# Patient Record
Sex: FEMALE | Race: WHITE | NOT HISPANIC OR LATINO | Employment: OTHER | URBAN - METROPOLITAN AREA
[De-identification: names, ages, dates, MRNs, and addresses within clinical notes are randomized per-mention and may not be internally consistent; named-entity substitution may affect disease eponyms.]

---

## 2023-08-11 ENCOUNTER — HOSPITAL ENCOUNTER (OUTPATIENT)
Dept: DATA CONVERSION | Facility: HOSPITAL | Age: 68
Discharge: HOME | End: 2023-08-11

## 2023-08-11 DIAGNOSIS — T78.40XA ALLERGY, UNSPECIFIED, INITIAL ENCOUNTER: ICD-10-CM

## 2023-08-11 DIAGNOSIS — Z88.8 ALLERGY STATUS TO OTHER DRUGS, MEDICAMENTS AND BIOLOGICAL SUBSTANCES: ICD-10-CM

## 2023-08-11 LAB
ALBUMIN SERPL-MCNC: 3.8 GM/DL (ref 3.5–5)
ALBUMIN/GLOB SERPL: 1.2 RATIO (ref 1.5–3)
ALP BLD-CCNC: 89 U/L (ref 35–125)
ALT SERPL-CCNC: 10 U/L (ref 5–40)
ANION GAP SERPL CALCULATED.3IONS-SCNC: 14 MMOL/L (ref 0–19)
AST SERPL-CCNC: 15 U/L (ref 5–40)
BASOPHILS # BLD AUTO: 0.01 K/UL (ref 0–0.22)
BASOPHILS NFR BLD AUTO: 0.1 % (ref 0–1)
BILIRUB SERPL-MCNC: 0.4 MG/DL (ref 0.1–1.2)
BUN SERPL-MCNC: 33 MG/DL (ref 8–25)
BUN/CREAT SERPL: 23.6 RATIO (ref 8–21)
CALCIUM SERPL-MCNC: 9.1 MG/DL (ref 8.5–10.4)
CHLORIDE SERPL-SCNC: 105 MMOL/L (ref 97–107)
CO2 SERPL-SCNC: 23 MMOL/L (ref 24–31)
CREAT SERPL-MCNC: 1.4 MG/DL (ref 0.4–1.6)
DEPRECATED RDW RBC AUTO: 43.4 FL (ref 37–54)
DIFFERENTIAL METHOD BLD: ABNORMAL
EOSINOPHIL # BLD AUTO: 0 K/UL (ref 0–0.45)
EOSINOPHIL NFR BLD: 0 % (ref 0–3)
ERYTHROCYTE [DISTWIDTH] IN BLOOD BY AUTOMATED COUNT: 12.8 % (ref 11.7–15)
GFR SERPL CREATININE-BSD FRML MDRD: 41 ML/MIN/1.73 M2
GLOBULIN SER-MCNC: 3.1 G/DL (ref 1.9–3.7)
GLUCOSE SERPL-MCNC: 117 MG/DL (ref 65–99)
HCT VFR BLD AUTO: 42.7 % (ref 36–44)
HGB BLD-MCNC: 13.7 GM/DL (ref 12–15)
IMM GRANULOCYTES # BLD AUTO: 0.03 K/UL (ref 0–0.1)
LYMPHOCYTES # BLD AUTO: 0.82 K/UL (ref 1.2–3.2)
LYMPHOCYTES NFR BLD MANUAL: 9.3 % (ref 20–40)
MCH RBC QN AUTO: 29.5 PG (ref 26–34)
MCHC RBC AUTO-ENTMCNC: 32.1 % (ref 31–37)
MCV RBC AUTO: 92 FL (ref 80–100)
MONOCYTES # BLD AUTO: 0.56 K/UL (ref 0–0.8)
MONOCYTES NFR BLD MANUAL: 6.3 % (ref 0–8)
NEUTROPHILS # BLD AUTO: 7.43 K/UL
NEUTROPHILS # BLD AUTO: 7.43 K/UL (ref 1.8–7.7)
NEUTROPHILS.IMMATURE NFR BLD: 0.3 % (ref 0–1)
NEUTS SEG NFR BLD: 84 % (ref 50–70)
NRBC BLD-RTO: 0 /100 WBC
PLATELET # BLD AUTO: 288 K/UL (ref 150–450)
PMV BLD AUTO: 10.7 CU (ref 7–12.6)
POTASSIUM SERPL-SCNC: 4.4 MMOL/L (ref 3.4–5.1)
PROT SERPL-MCNC: 6.9 G/DL (ref 5.9–7.9)
RBC # BLD AUTO: 4.64 M/UL (ref 4–4.9)
SODIUM SERPL-SCNC: 142 MMOL/L (ref 133–145)
WBC # BLD AUTO: 8.9 K/UL (ref 4.5–11)

## 2023-08-12 LAB
IGA SERPL-MCNC: NORMAL MG/DL
IGE SERPL-ACNC: NORMAL [IU]/L
IGG SERPL-MCNC: NORMAL MG/DL
IGM SERPL-MCNC: NORMAL MG/DL

## 2023-08-17 ENCOUNTER — HOSPITAL ENCOUNTER (OUTPATIENT)
Dept: DATA CONVERSION | Facility: HOSPITAL | Age: 68
Discharge: HOME | End: 2023-08-17
Payer: MEDICARE

## 2023-08-17 DIAGNOSIS — N18.30 CHRONIC KIDNEY DISEASE, STAGE 3 UNSPECIFIED (MULTI): ICD-10-CM

## 2023-08-17 LAB
25(OH)D3 SERPL-MCNC: 25 NG/ML (ref 31–100)
ALBUMIN SERPL-MCNC: 3.8 GM/DL (ref 3.5–5)
ANION GAP SERPL CALCULATED.3IONS-SCNC: 16 MMOL/L (ref 0–19)
BUN SERPL-MCNC: 19 MG/DL (ref 8–25)
BUN/CREAT SERPL: 15.8 RATIO (ref 8–21)
CALCIUM SERPL-MCNC: 9 MG/DL (ref 8.5–10.4)
CHLORIDE SERPL-SCNC: 108 MMOL/L (ref 97–107)
CO2 SERPL-SCNC: 22 MMOL/L (ref 24–31)
CREAT SERPL-MCNC: 1.2 MG/DL (ref 0.4–1.6)
DEPRECATED RDW RBC AUTO: 42 FL (ref 37–54)
ERYTHROCYTE [DISTWIDTH] IN BLOOD BY AUTOMATED COUNT: 12.7 % (ref 11.7–15)
GFR SERPL CREATININE-BSD FRML MDRD: 49 ML/MIN/1.73 M2
GLUCOSE SERPL-MCNC: 118 MG/DL (ref 65–99)
HCT VFR BLD AUTO: 42.3 % (ref 36–44)
HGB BLD-MCNC: 13.7 GM/DL (ref 12–15)
MCH RBC QN AUTO: 29 PG (ref 26–34)
MCHC RBC AUTO-ENTMCNC: 32.4 % (ref 31–37)
MCV RBC AUTO: 89.4 FL (ref 80–100)
NRBC BLD-RTO: 0 /100 WBC
PHOSPHATE SERPL-MCNC: 3.5 MG/DL (ref 2.5–4.5)
PLATELET # BLD AUTO: 333 K/UL (ref 150–450)
PMV BLD AUTO: 9.9 CU (ref 7–12.6)
POTASSIUM SERPL-SCNC: 4.1 MMOL/L (ref 3.4–5.1)
PTH-INTACT SERPL-MCNC: 65 PG/ML (ref 15–65)
RBC # BLD AUTO: 4.73 M/UL (ref 4–4.9)
SODIUM SERPL-SCNC: 146 MMOL/L (ref 133–145)
WBC # BLD AUTO: 7.6 K/UL (ref 4.5–11)

## 2023-08-31 ENCOUNTER — HOSPITAL ENCOUNTER (OUTPATIENT)
Dept: DATA CONVERSION | Facility: HOSPITAL | Age: 68
Discharge: HOME | End: 2023-08-31
Payer: MEDICARE

## 2023-08-31 DIAGNOSIS — I10 ESSENTIAL (PRIMARY) HYPERTENSION: ICD-10-CM

## 2023-08-31 DIAGNOSIS — R06.02 SHORTNESS OF BREATH: ICD-10-CM

## 2023-09-21 ENCOUNTER — HOSPITAL ENCOUNTER (OUTPATIENT)
Dept: DATA CONVERSION | Facility: HOSPITAL | Age: 68
Discharge: HOME | End: 2023-09-21
Payer: MEDICARE

## 2023-09-21 DIAGNOSIS — Z00.00 ENCOUNTER FOR GENERAL ADULT MEDICAL EXAMINATION WITHOUT ABNORMAL FINDINGS: ICD-10-CM

## 2023-09-21 DIAGNOSIS — R05.1 ACUTE COUGH: ICD-10-CM

## 2023-10-02 DIAGNOSIS — K21.9 GASTROESOPHAGEAL REFLUX DISEASE, UNSPECIFIED WHETHER ESOPHAGITIS PRESENT: ICD-10-CM

## 2023-10-02 PROBLEM — J96.00 ACUTE RESPIRATORY FAILURE (MULTI): Status: ACTIVE | Noted: 2023-10-02

## 2023-10-02 PROBLEM — R10.9 ABDOMINAL PAIN: Status: ACTIVE | Noted: 2023-10-02

## 2023-10-02 PROBLEM — J06.9 ACUTE UPPER RESPIRATORY INFECTION: Status: ACTIVE | Noted: 2023-10-02

## 2023-10-02 PROBLEM — J30.9 ALLERGIC RHINITIS: Status: ACTIVE | Noted: 2023-10-02

## 2023-10-02 PROBLEM — J01.90 ACUTE SINUSITIS: Status: ACTIVE | Noted: 2023-10-02

## 2023-10-02 RX ORDER — OMEPRAZOLE 40 MG/1
40 CAPSULE, DELAYED RELEASE ORAL
COMMUNITY
Start: 2023-08-31

## 2023-10-02 RX ORDER — OMEPRAZOLE 40 MG/1
CAPSULE, DELAYED RELEASE ORAL
Qty: 30 CAPSULE | Refills: 0 | Status: SHIPPED | OUTPATIENT
Start: 2023-10-02

## 2023-10-02 NOTE — TELEPHONE ENCOUNTER
Requested Prescriptions     Pending Prescriptions Disp Refills    omeprazole (PriLOSEC) 40 mg DR capsule [Pharmacy Med Name: Omeprazole Oral Capsule Delayed Release 40 MG] 30 capsule 0     Sig: TAKE ONE CAPSULE BY MOUTH DAILY IN THE MORNING 30 MINUTES BEFORE BREAKFAST    Last appointment 9/2022

## 2023-11-16 ENCOUNTER — LAB (OUTPATIENT)
Dept: LAB | Facility: LAB | Age: 68
End: 2023-11-16
Payer: MEDICARE

## 2023-11-16 DIAGNOSIS — N18.30 CHRONIC KIDNEY DISEASE, STAGE 3 UNSPECIFIED (MULTI): Primary | ICD-10-CM

## 2023-11-16 LAB
ALBUMIN SERPL BCP-MCNC: 4.2 G/DL (ref 3.4–5)
ANION GAP SERPL CALC-SCNC: 14 MMOL/L (ref 10–20)
BASOPHILS # BLD AUTO: 0.06 X10*3/UL (ref 0–0.1)
BASOPHILS NFR BLD AUTO: 0.9 %
BUN SERPL-MCNC: 25 MG/DL (ref 6–23)
CALCIUM SERPL-MCNC: 8.9 MG/DL (ref 8.6–10.3)
CHLORIDE SERPL-SCNC: 104 MMOL/L (ref 98–107)
CO2 SERPL-SCNC: 26 MMOL/L (ref 21–32)
CREAT SERPL-MCNC: 1.23 MG/DL (ref 0.5–1.05)
EOSINOPHIL # BLD AUTO: 0.06 X10*3/UL (ref 0–0.7)
EOSINOPHIL NFR BLD AUTO: 0.9 %
ERYTHROCYTE [DISTWIDTH] IN BLOOD BY AUTOMATED COUNT: 13.2 % (ref 11.5–14.5)
GFR SERPL CREATININE-BSD FRML MDRD: 47 ML/MIN/1.73M*2
GLUCOSE SERPL-MCNC: 84 MG/DL (ref 74–99)
HCT VFR BLD AUTO: 40.9 % (ref 36–46)
HGB BLD-MCNC: 13.1 G/DL (ref 12–16)
IMM GRANULOCYTES # BLD AUTO: 0.01 X10*3/UL (ref 0–0.7)
IMM GRANULOCYTES NFR BLD AUTO: 0.2 % (ref 0–0.9)
LYMPHOCYTES # BLD AUTO: 1.35 X10*3/UL (ref 1.2–4.8)
LYMPHOCYTES NFR BLD AUTO: 20.6 %
MCH RBC QN AUTO: 29.8 PG (ref 26–34)
MCHC RBC AUTO-ENTMCNC: 32 G/DL (ref 32–36)
MCV RBC AUTO: 93 FL (ref 80–100)
MONOCYTES # BLD AUTO: 0.46 X10*3/UL (ref 0.1–1)
MONOCYTES NFR BLD AUTO: 7 %
NEUTROPHILS # BLD AUTO: 4.6 X10*3/UL (ref 1.2–7.7)
NEUTROPHILS NFR BLD AUTO: 70.4 %
NRBC BLD-RTO: 0 /100 WBCS (ref 0–0)
PHOSPHATE SERPL-MCNC: 4.1 MG/DL (ref 2.5–4.9)
PLATELET # BLD AUTO: 314 X10*3/UL (ref 150–450)
POTASSIUM SERPL-SCNC: 4.1 MMOL/L (ref 3.5–5.3)
RBC # BLD AUTO: 4.4 X10*6/UL (ref 4–5.2)
SODIUM SERPL-SCNC: 140 MMOL/L (ref 136–145)
WBC # BLD AUTO: 6.5 X10*3/UL (ref 4.4–11.3)

## 2023-11-16 PROCEDURE — 36415 COLL VENOUS BLD VENIPUNCTURE: CPT

## 2023-11-16 PROCEDURE — 85025 COMPLETE CBC W/AUTO DIFF WBC: CPT

## 2023-11-16 PROCEDURE — 82306 VITAMIN D 25 HYDROXY: CPT

## 2023-11-16 PROCEDURE — 80069 RENAL FUNCTION PANEL: CPT

## 2023-11-16 PROCEDURE — 83970 ASSAY OF PARATHORMONE: CPT

## 2023-11-17 LAB
25(OH)D3 SERPL-MCNC: 35 NG/ML (ref 30–100)
PTH-INTACT SERPL-MCNC: 84.3 PG/ML (ref 18.5–88)

## 2024-02-12 ENCOUNTER — APPOINTMENT (OUTPATIENT)
Dept: PHYSICAL THERAPY | Facility: CLINIC | Age: 69
End: 2024-02-12
Payer: MEDICARE

## 2024-02-20 ENCOUNTER — HOSPITAL ENCOUNTER (OUTPATIENT)
Dept: RADIOLOGY | Facility: CLINIC | Age: 69
Discharge: HOME | End: 2024-02-20
Payer: MEDICARE

## 2024-02-20 DIAGNOSIS — R09.89 OTHER SPECIFIED SYMPTOMS AND SIGNS INVOLVING THE CIRCULATORY AND RESPIRATORY SYSTEMS: ICD-10-CM

## 2024-02-20 PROCEDURE — 71046 X-RAY EXAM CHEST 2 VIEWS: CPT | Performed by: STUDENT IN AN ORGANIZED HEALTH CARE EDUCATION/TRAINING PROGRAM

## 2024-02-20 PROCEDURE — 71046 X-RAY EXAM CHEST 2 VIEWS: CPT

## 2024-02-28 ASSESSMENT — ENCOUNTER SYMPTOMS
PAIN SCALE AT LOWEST: 4
PAIN SCALE AT HIGHEST: 9

## 2024-02-28 NOTE — PROGRESS NOTES
Physical Therapy Evaluation and Treatment     Patient Name: Arabella Hanks  MRN: [id number]  PT Received On: 24  Time Calculation  Start Time: 0815  Stop Time: 0900  Time Calculation (min): 45 min  PT Evaluation Time Entry  PT Evaluation (Moderate) Time Entry: 30  PT Modalities Time Entry  E-Stim (Unattended) Time Entry: 15    Current Problem:   M54.41 (ICD-10-CM) - Lumbago with sciatica, right side   G89.29 (ICD-10-CM) - Other chronic pain     Precautions:       Subjective Evaluation    History of Present Illness  Date of onset: 2024  Mechanism of injury: 67 yo female well known to this PT clinic presents with chronic low back pain, R buttock pain . Pt states her pain worsens with activity, walking, or prolonged standing. Pt also c/o R shoulder pain and lower neck pain that bother her. Pt has had relief in the past with exercise and dry needling or electric stimulation. Per pt she recently had a vein procedure done on her lower legs. She also has been battling high blood pressure and was put on medication.     Pain  Current pain ratin  At best pain ratin  At worst pain ratin  Location: constant throbbing, shooting pain into R buttock  Quality: throbbing  Relieving factors: change in position  Aggravating factors: movement  Progression: worsening    Treatments  Previous treatment: physical therapy and chiropractic  Patient Goals  Patient goals for therapy: increased strength, decreased pain and increased motion           Objective     Palpation     Right   Hypertonic in the gluteus medius and TFL. Tenderness of the gluteus medius and TFL.     Tenderness     Left Hip   No tenderness in the PSIS.     Right Hip   Tenderness in the PSIS.     Strength/Myotome Testing     Left Hip   Planes of Motion   Flexion: 4  Extension: 4-  Abduction: 4-  Adduction: 4  External rotation: 4  Internal rotation: 4    Right Hip   Planes of Motion   Flexion: 4  Extension: 4-  Abduction: 4-  Adduction: 4  External  rotation: 4  Internal rotation: 4    Left Knee   Flexion: 4  Extension: 4    Right Knee   Flexion: 4  Extension: 4    Left Ankle/Foot   Dorsiflexion: 4  Plantar flexion: 5  Great toe extension: 5    Right Ankle/Foot   Dorsiflexion: 4  Plantar flexion: 5  Great toe extension: 5    Ambulation     Observational Gait   Gait: within functional limits   Walking speed and stride length within functional limits.             Other Measures  Other Outcome Measures: Low back Disability Questionnaire: 36% impaired    Treatments:     Therapeutic Exercise:   PPT x 5  Bridge x 5  LTR x 5  Open book L/R x 5      Neuromuscular Re-Ed:     Gait Training:     Manual Therapy:     Modalities:   Dry needling following informed consent: with e-stim; 4Hz, mA to tolerance, applied to lumbar and R gluteus medius/TFL, for 15 minutes.      Assessment & Plan     Assessment  Impairments: abnormal muscle tone, abnormal or restricted ROM, activity intolerance, impaired physical strength, lacks appropriate home exercise program and pain with function  Assessment details: Patient presents with low back pain with referred RLE pain. Key impairments include: decreased ROM and strength, postural deficits and pain with functional activities such as standing, walking, and bending. Patient would benefit from skilled physical therapy services to address these impairments and restore normal ROM and strength to reduce pain and improving activity participation.    Prognosis: good    Plan  Therapy options: will be seen for skilled physical therapy services  Planned modality interventions: TENS, electrical stimulation/Russian stimulation, cryotherapy and thermotherapy (hydrocollator packs)  Planned therapy interventions: manual therapy, spinal/joint mobilization, soft tissue mobilization, stretching, strengthening, home exercise program, functional ROM exercises, neuromuscular re-education and postural training  Frequency: 2x week  Duration in visits:  12  Duration in weeks: 10  Treatment plan discussed with: patient  Plan details: 1-2x week for 12 visits       Moderate complexity due to patient's clinical presentation being evolving with changing characteristics, with comorbidities to include migraines, chronic back pain, HTN, RA, and CKD, all of which may negatively impact rehab tolerance and progression.     Post-Treatment Pain:  Response to Interventions: 5    Goals:   Active       PT Problem       PT Goal 1       Start:  02/29/24    Expected End:  05/29/24       After 12 weeks of skilled physical therapy....    1. Patient to demonstrate the use of proper body mechanics and posture when performing ADL's and picking up objects > 10 lbs to eliminate/reduce their symptoms for self care independence.    2. Patient to sleep uninterrupted for 6 hours without being awakened by their pain to allow them to perform ADL's and work effectively during the day.     3. Patient to be able to load and unload the  without increased pain in their low back and demonstrate proper body mechanics.    4. Decrease patient's subjective low back pain to 3/10.    5. Patient will be able to stand > 30 minutes without increased pain to allow patient to prepare meals for self care independence.    6. Patient to rotate trunk to the left to look over shoulder when driving to safely switch lanes and back up an automobile.    7. Patient to be able to load and unload her  without increased pain in their low back while demonstrating proper body mechanics for self care independence.    8. Patient will improve their Low Back Disability Score to < 20% to allow patient to carry groceries into home without symptom exacerbation.

## 2024-02-29 ENCOUNTER — EVALUATION (OUTPATIENT)
Dept: PHYSICAL THERAPY | Facility: CLINIC | Age: 69
End: 2024-02-29
Payer: MEDICARE

## 2024-02-29 DIAGNOSIS — G89.29 OTHER CHRONIC PAIN: ICD-10-CM

## 2024-02-29 DIAGNOSIS — M54.41 LUMBAGO WITH SCIATICA, RIGHT SIDE: ICD-10-CM

## 2024-02-29 PROCEDURE — 97014 ELECTRIC STIMULATION THERAPY: CPT | Mod: GP

## 2024-02-29 PROCEDURE — 97162 PT EVAL MOD COMPLEX 30 MIN: CPT | Mod: GP

## 2024-02-29 ASSESSMENT — ENCOUNTER SYMPTOMS
QUALITY: THROBBING
EXACERBATED BY: MOVEMENT
ALLEVIATING FACTORS: CHANGE IN POSITION
PAIN SCALE: 6

## 2024-02-29 ASSESSMENT — PAIN SCALES - GENERAL: PAINLEVEL_OUTOF10: 6

## 2024-02-29 ASSESSMENT — PAIN - FUNCTIONAL ASSESSMENT: PAIN_FUNCTIONAL_ASSESSMENT: 0-10

## 2024-02-29 NOTE — LETTER
February 29, 2024    Cheryl Fletcher DO  [address] [facility] [address]    Patient: Arabella Hanks   YOB: 1955   Date of Visit: 2/29/2024       Dear Cheryl Fletcher DO  [address] [place] [address]    The attached plan of care is being sent to you because your patient’s medical reimbursement requires that you certify the plan of care. Your signature is required to allow uninterrupted insurance coverage.      You may indicate your approval by signing below and faxing this form back to us at Dept Fax: [phone].    Please call Dept: [phone] option 2 with any questions or concerns.    Thank you for this referral,        Paul Montoya PT  [facility] PHYSICIAN [place]  [facility] PHYSICIAN AL[place]ERTO  [address] [facility] [id number] [phone]    Payer: Payor: MEDICAL [facility] MEDICARE / Plan: [facility] MEDICARE / Product Type: *No Product type* /                                                                         Date:     Dear Paul Montoya PT,     Re: Ms. Arabella Hanks, MRN:[id number]    I certify that I have reviewed the attached plan of care and it is medically necessary for Ms. Arabella Hanks (1955) who is under my care.          ______________________________________                    _________________  Provider name and credentials                                           Date and time                                                                                           Plan of Care 2/29/24   Effective from: 2/29/2024  Effective to: 5/29/2024    Plan ID: [id number]                Participants as of Finalize on 2/29/2024      Name Type Comments Contact Info    Cheryl Fletcher DO Primary Care Provider  [phone]    Paul Montoya PT Physical Therapist  [phone]           Last Plan Note       Author: Paul Montoya PT Status: Sign when Signing Visit Last edited: 2/29/2024  8:15 AM         Physical Therapy  Evaluation and Treatment     Patient Name: Arabella Hanks  MRN: [id number]  PT Received On: 24  Time Calculation  Start Time: 0815  Stop Time: 0900  Time Calculation (min): 45 min  PT Evaluation Time Entry  PT Evaluation (Moderate) Time Entry: 30  PT Modalities Time Entry  E-Stim (Unattended) Time Entry: 15    Current Problem:   M54.41 (ICD-10-CM) - Lumbago with sciatica, right side   G89.29 (ICD-10-CM) - Other chronic pain     Precautions:       Subjective Evaluation    History of Present Illness  Date of onset: 2024  Mechanism of injury: 69 yo female well known to this PT clinic presents with chronic low back pain, R buttock pain . Pt states her pain worsens with activity, walking, or prolonged standing. Pt also c/o R shoulder pain and lower neck pain that bother her. Pt has had relief in the past with exercise and dry needling or electric stimulation. Per pt she recently had a vein procedure done on her lower legs. She also has been battling high blood pressure and was put on medication.     Pain  Current pain ratin  At best pain ratin  At worst pain ratin  Location: constant throbbing, shooting pain into R buttock  Quality: throbbing  Relieving factors: change in position  Aggravating factors: movement  Progression: worsening    Treatments  Previous treatment: physical therapy and chiropractic  Patient Goals  Patient goals for therapy: increased strength, decreased pain and increased motion           Objective     Palpation     Right   Hypertonic in the gluteus medius and TFL. Tenderness of the gluteus medius and TFL.     Tenderness     Left Hip   No tenderness in the PSIS.     Right Hip   Tenderness in the PSIS.     Strength/Myotome Testing     Left Hip   Planes of Motion   Flexion: 4  Extension: 4-  Abduction: 4-  Adduction: 4  External rotation: 4  Internal rotation: 4    Right Hip   Planes of Motion   Flexion: 4  Extension: 4-  Abduction: 4-  Adduction: 4  External rotation:  4  Internal rotation: 4    Left Knee   Flexion: 4  Extension: 4    Right Knee   Flexion: 4  Extension: 4    Left Ankle/Foot   Dorsiflexion: 4  Plantar flexion: 5  Great toe extension: 5    Right Ankle/Foot   Dorsiflexion: 4  Plantar flexion: 5  Great toe extension: 5    Ambulation     Observational Gait   Gait: within functional limits   Walking speed and stride length within functional limits.             Other Measures  Other Outcome Measures: Low back Disability Questionnaire: 36% impaired    Treatments:     Therapeutic Exercise:   PPT x 5  Bridge x 5  LTR x 5  Open book L/R x 5      Neuromuscular Re-Ed:     Gait Training:     Manual Therapy:     Modalities:   Dry needling following informed consent: with e-stim; 4Hz, mA to tolerance, applied to lumbar and R gluteus medius/TFL, for 15 minutes.      Assessment & Plan     Assessment  Impairments: abnormal muscle tone, abnormal or restricted ROM, activity intolerance, impaired physical strength, lacks appropriate home exercise program and pain with function  Assessment details: Patient presents with low back pain with referred RLE pain. Key impairments include: decreased ROM and strength, postural deficits and pain with functional activities such as standing, walking, and bending. Patient would benefit from skilled physical therapy services to address these impairments and restore normal ROM and strength to reduce pain and improving activity participation.    Prognosis: good    Plan  Therapy options: will be seen for skilled physical therapy services  Planned modality interventions: TENS, electrical stimulation/Russian stimulation, cryotherapy and thermotherapy (hydrocollator packs)  Planned therapy interventions: manual therapy, spinal/joint mobilization, soft tissue mobilization, stretching, strengthening, home exercise program, functional ROM exercises, neuromuscular re-education and postural training  Frequency: 2x week  Duration in visits: 12  Duration in  weeks: 10  Treatment plan discussed with: patient  Plan details: 1-2x week for 12 visits       Moderate complexity due to patient's clinical presentation being evolving with changing characteristics, with comorbidities to include migraines, chronic back pain, HTN, RA, and CKD, all of which may negatively impact rehab tolerance and progression.     Post-Treatment Pain:  Response to Interventions: 5    Goals:   Active       PT Problem       PT Goal 1       Start:  02/29/24    Expected End:  05/29/24       After 12 weeks of skilled physical therapy....    1. Patient to demonstrate the use of proper body mechanics and posture when performing ADL's and picking up objects > 10 lbs to eliminate/reduce their symptoms for self care independence.    2. Patient to sleep uninterrupted for 6 hours without being awakened by their pain to allow them to perform ADL's and work effectively during the day.     3. Patient to be able to load and unload the  without increased pain in their low back and demonstrate proper body mechanics.    4. Decrease patient's subjective low back pain to 3/10.    5. Patient will be able to stand > 30 minutes without increased pain to allow patient to prepare meals for self care independence.    6. Patient to rotate trunk to the left to look over shoulder when driving to safely switch lanes and back up an automobile.    7. Patient to be able to load and unload her  without increased pain in their low back while demonstrating proper body mechanics for self care independence.    8. Patient will improve their Low Back Disability Score to < 20% to allow patient to carry groceries into home without symptom exacerbation.                  Current Participants as of 2/29/2024      Name Type Comments Contact Info    Cheryl Fletcher DO Primary Care Provider  [phone]    Signature pending    Paul Montoya PT Physical Therapist  [phone]    Electronically signed by Paul SPAIN  Lindsay, PT at 2/29/2024 0922 EST

## 2024-02-29 NOTE — LETTER
February 29, 2024    Cheryl Fletcher DO  [address] [facility] [address]    Patient: Arabella Hanks   YOB: 1955   Date of Visit: 2/29/2024       Dear Cheryl Fletcher DO  [address] [place] [address]    The attached plan of care is being sent to you because your patient’s medical reimbursement requires that you certify the plan of care. Your signature is required to allow uninterrupted insurance coverage.      You may indicate your approval by signing below and faxing this form back to us at Dept Fax: [phone].    Please call Dept: [phone] with any questions or concerns.    Thank you for this referral,        Paul Montoya PT  [facility] PHYSICIAN [place]  [facility] PHYSICIAN AL[place]ERTO  [address] [facility] [id number] [phone]    Payer: Payor: MEDICAL [facility] MEDICARE / Plan: [facility] MEDICARE / Product Type: *No Product type* /                                                                         Date:     Dear Paul Montoya PT,     Re: Ms. Arabella Hanks, MRN:[id number]    I certify that I have reviewed the attached plan of care and it is medically necessary for Ms. Arabella Hanks (1955) who is under my care.          ______________________________________                    _________________  Provider name and credentials                                           Date and time                                                                                           Plan of Care 2/29/24   Effective from: 2/29/2024  Effective to: 5/29/2024    Plan ID: [id number]            Participants as of Finalize on 2/29/2024    Name Type Comments Contact Info    Cheryl Fletcher DO Primary Care Provider  [phone]    Paul Montoya PT Physical Therapist  [phone]       Last Plan Note     Author: Paul Montoya PT Status: Sign when Signing Visit Last edited: 2/29/2024  8:15 AM       Physical Therapy Evaluation and Treatment      Patient Name: Arabella Hanks  MRN: [id number]  PT Received On: 24  Time Calculation  Start Time: 0815  Stop Time: 0900  Time Calculation (min): 45 min  PT Evaluation Time Entry  PT Evaluation (Moderate) Time Entry: 30  PT Modalities Time Entry  E-Stim (Unattended) Time Entry: 15    Current Problem:   M54.41 (ICD-10-CM) - Lumbago with sciatica, right side   G89.29 (ICD-10-CM) - Other chronic pain     Precautions:       Subjective Evaluation    History of Present Illness  Date of onset: 2024  Mechanism of injury: 67 yo female well known to this PT clinic presents with chronic low back pain, R buttock pain . Pt states her pain worsens with activity, walking, or prolonged standing. Pt also c/o R shoulder pain and lower neck pain that bother her. Pt has had relief in the past with exercise and dry needling or electric stimulation. Per pt she recently had a vein procedure done on her lower legs. She also has been battling high blood pressure and was put on medication.     Pain  Current pain ratin  At best pain ratin  At worst pain ratin  Location: constant throbbing, shooting pain into R buttock  Quality: throbbing  Relieving factors: change in position  Aggravating factors: movement  Progression: worsening    Treatments  Previous treatment: physical therapy and chiropractic  Patient Goals  Patient goals for therapy: increased strength, decreased pain and increased motion           Objective     Palpation     Right   Hypertonic in the gluteus medius and TFL. Tenderness of the gluteus medius and TFL.     Tenderness     Left Hip   No tenderness in the PSIS.     Right Hip   Tenderness in the PSIS.     Strength/Myotome Testing     Left Hip   Planes of Motion   Flexion: 4  Extension: 4-  Abduction: 4-  Adduction: 4  External rotation: 4  Internal rotation: 4    Right Hip   Planes of Motion   Flexion: 4  Extension: 4-  Abduction: 4-  Adduction: 4  External rotation: 4  Internal rotation: 4    Left  Knee   Flexion: 4  Extension: 4    Right Knee   Flexion: 4  Extension: 4    Left Ankle/Foot   Dorsiflexion: 4  Plantar flexion: 5  Great toe extension: 5    Right Ankle/Foot   Dorsiflexion: 4  Plantar flexion: 5  Great toe extension: 5    Ambulation     Observational Gait   Gait: within functional limits   Walking speed and stride length within functional limits.             Other Measures  Other Outcome Measures: Low back Disability Questionnaire: 36% impaired    Treatments:     Therapeutic Exercise:   PPT x 5  Bridge x 5  LTR x 5  Open book L/R x 5      Neuromuscular Re-Ed:     Gait Training:     Manual Therapy:     Modalities:   Dry needling following informed consent: with e-stim; 4Hz, mA to tolerance, applied to lumbar and R gluteus medius/TFL, for 15 minutes.      Assessment & Plan     Assessment  Impairments: abnormal muscle tone, abnormal or restricted ROM, activity intolerance, impaired physical strength, lacks appropriate home exercise program and pain with function  Assessment details: Patient presents with low back pain with referred RLE pain. Key impairments include: decreased ROM and strength, postural deficits and pain with functional activities such as standing, walking, and bending. Patient would benefit from skilled physical therapy services to address these impairments and restore normal ROM and strength to reduce pain and improving activity participation.    Prognosis: good    Plan  Therapy options: will be seen for skilled physical therapy services  Planned modality interventions: TENS, electrical stimulation/Russian stimulation, cryotherapy and thermotherapy (hydrocollator packs)  Planned therapy interventions: manual therapy, spinal/joint mobilization, soft tissue mobilization, stretching, strengthening, home exercise program, functional ROM exercises, neuromuscular re-education and postural training  Frequency: 2x week  Duration in visits: 12  Duration in weeks: 10  Treatment plan discussed  with: patient  Plan details: 1-2x week for 12 visits       Moderate complexity due to patient's clinical presentation being evolving with changing characteristics, with comorbidities to include migraines, chronic back pain, HTN, RA, and CKD, all of which may negatively impact rehab tolerance and progression.     Post-Treatment Pain:  Response to Interventions: 5    Goals:   Active       PT Problem       PT Goal 1       Start:  02/29/24    Expected End:  05/29/24       After 12 weeks of skilled physical therapy....    1. Patient to demonstrate the use of proper body mechanics and posture when performing ADL's and picking up objects > 10 lbs to eliminate/reduce their symptoms for self care independence.    2. Patient to sleep uninterrupted for 6 hours without being awakened by their pain to allow them to perform ADL's and work effectively during the day.     3. Patient to be able to load and unload the  without increased pain in their low back and demonstrate proper body mechanics.    4. Decrease patient's subjective low back pain to 3/10.    5. Patient will be able to stand > 30 minutes without increased pain to allow patient to prepare meals for self care independence.    6. Patient to rotate trunk to the left to look over shoulder when driving to safely switch lanes and back up an automobile.    7. Patient to be able to load and unload her  without increased pain in their low back while demonstrating proper body mechanics for self care independence.    8. Patient will improve their Low Back Disability Score to < 20% to allow patient to carry groceries into home without symptom exacerbation.                  Current Participants as of 2/29/2024    Name Type Comments Contact Info    Cheryl Fletcher DO Primary Care Provider  [phone]    Signature pending    Paul Montoya PT Physical Therapist  [phone]    Signature pending

## 2024-03-18 ENCOUNTER — TREATMENT (OUTPATIENT)
Dept: PHYSICAL THERAPY | Facility: CLINIC | Age: 69
End: 2024-03-18
Payer: MEDICARE

## 2024-03-18 DIAGNOSIS — G89.29 OTHER CHRONIC PAIN: ICD-10-CM

## 2024-03-18 DIAGNOSIS — M54.41 LUMBAGO WITH SCIATICA, RIGHT SIDE: ICD-10-CM

## 2024-03-18 PROCEDURE — 97110 THERAPEUTIC EXERCISES: CPT | Mod: GP

## 2024-03-18 PROCEDURE — 97014 ELECTRIC STIMULATION THERAPY: CPT | Mod: GP

## 2024-03-18 ASSESSMENT — PAIN SCALES - GENERAL: PAINLEVEL_OUTOF10: 5 - MODERATE PAIN

## 2024-03-18 ASSESSMENT — PAIN - FUNCTIONAL ASSESSMENT: PAIN_FUNCTIONAL_ASSESSMENT: 0-10

## 2024-03-18 NOTE — PROGRESS NOTES
"Physical Therapy Treatment    Patient Name: Arabella Hanks  MRN: [id number]  PT Received On: 03/18/24  Time Calculation  Start Time: 0815  Stop Time: 0905  Time Calculation (min): 50 min  PT Modalities Time Entry  E-Stim (Unattended) Time Entry: 15  PT Therapeutic Procedures Time Entry  Therapeutic Exercise Time Entry: 28  Visit Number: 2  Visits/Dates Authorized: 12    Current Problem:   1. Lumbago with sciatica, right side  Follow Up In Physical Therapy      2. 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        Pre-Treatment Symptoms:   Pain Assessment: 0-10  Pain Score: 5 - Moderate pain    Objective   Findings: Tenderness L lumbar paraspinals    Treatments:      Therapeutic Exercise:   Nustep lv 5 x 4 min  Tball heel slides 2x10  Tball bridge 2x5  Bridge x 5  Open book L/R x 10  Hooklying hip abd light 30\" x 5 iso  Hip IR/ER x10  LTR x10  Seated Tball lumbar flexion 2x15  Seated cat/cow  x10  Sciatic n. Floss/tensioners x 15  Seated on Tball PPT, circles, 2x10 each  FT chops 10# x15 L/R  Pallof press red x10 L/R  Push/pull red x15  Single arm row red x 15 L/R    Neuromuscular Re-Ed:       Therapeutic Activity:      Gait Training:       Manual Therapy:       Modalities:   Dry needling following informed consent: with e-stim; 5Hz, mA to tolerance, applied to L gluteus medius/TFL/gluteus elmer, L/R lumbar multifudus at L5, for 10 minutes.  R sidelying.     Assessment:    Pt worked on improving spinal mobility and gentle lumbar stabilization exercises. 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    Post-Treatment Symptoms:   Response to Interventions: 5    Plan:        Goals:   Active       PT Problem       PT Goal 1       Start:  02/29/24    " Expected End:  05/29/24       After 12 weeks of skilled physical therapy....    1. Patient to demonstrate the use of proper body mechanics and posture when performing ADL's and picking up objects > 10 lbs to eliminate/reduce their symptoms for self care independence.    2. Patient to sleep uninterrupted for 6 hours without being awakened by their pain to allow them to perform ADL's and work effectively during the day.     3. Patient to be able to load and unload the  without increased pain in their low back and demonstrate proper body mechanics.    4. Decrease patient's subjective low back pain to 3/10.    5. Patient will be able to stand > 30 minutes without increased pain to allow patient to prepare meals for self care independence.    6. Patient to rotate trunk to the left to look over shoulder when driving to safely switch lanes and back up an automobile.    7. Patient to be able to load and unload her  without increased pain in their low back while demonstrating proper body mechanics for self care independence.    8. Patient will improve their Low Back Disability Score to < 20% to allow patient to carry groceries into home without symptom exacerbation.

## 2024-03-21 ENCOUNTER — TREATMENT (OUTPATIENT)
Dept: PHYSICAL THERAPY | Facility: CLINIC | Age: 69
End: 2024-03-21
Payer: MEDICARE

## 2024-03-21 DIAGNOSIS — M54.41 LUMBAGO WITH SCIATICA, RIGHT SIDE: ICD-10-CM

## 2024-03-21 DIAGNOSIS — G89.29 OTHER CHRONIC PAIN: ICD-10-CM

## 2024-03-21 PROCEDURE — 97110 THERAPEUTIC EXERCISES: CPT | Mod: GP

## 2024-03-21 PROCEDURE — 97014 ELECTRIC STIMULATION THERAPY: CPT | Mod: GP

## 2024-03-21 PROCEDURE — 97140 MANUAL THERAPY 1/> REGIONS: CPT | Mod: GP

## 2024-03-21 ASSESSMENT — PAIN SCALES - GENERAL: PAINLEVEL_OUTOF10: 7

## 2024-03-21 ASSESSMENT — PAIN - FUNCTIONAL ASSESSMENT: PAIN_FUNCTIONAL_ASSESSMENT: 0-10

## 2024-03-21 NOTE — PROGRESS NOTES
"Physical Therapy Treatment    Patient Name: Arabella Hanks  MRN: [id number]  PT Received On: 03/21/24  Time Calculation  Start Time: 1600  Stop Time: 1645  Time Calculation (min): 45 min  PT Modalities Time Entry  E-Stim (Unattended) Time Entry: 15  PT Therapeutic Procedures Time Entry  Manual Therapy Time Entry: 20  Therapeutic Exercise Time Entry: 9  Visit Number: 3  Visits/Dates Authorized: 12    Current Problem:   1. Lumbago with sciatica, right side  Follow Up In Physical Therapy      2. Other chronic pain  Follow Up In Physical Therapy        Surgery:   Surgery date:     Precautions:        Subjective   General: Pt states she is very sore in her low back. 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        Pre-Treatment Symptoms:   Pain Assessment: 0-10  Pain Score: 7    Objective   Findings: Tenderness L lumbar paraspinals    Treatments:      Therapeutic Exercise:   Nustep lv 4 x 4 min  Open book L/R x 10  PPT x 15  LTR x10    DNP:  Seated Tball lumbar flexion 2x15  Seated cat/cow  x10  Sciatic n. Floss/tensioners x 15  Seated on Tball PPT, circles, 2x10 each  FT chops 10# x15 L/R  Pallof press red x10 L/R  Push/pull red x15  Single arm row red x 15 L/R  Hooklying hip abd light 30\" x 5 iso  Hip IR/ER x10  Tball heel slides 2x10  Tball bridge 2x5  Bridge x 5    Neuromuscular Re-Ed:       Therapeutic Activity:      Gait Training:       Manual Therapy:   Effleurage and petrissage to lumbar and thoracic paraspinals, thoracolumbar fascia. Manual trigger point release to gluteus medius and piriformis.    Modalities:   Unattended e-stim: IFC: applied to lumbar, Intensity to tolerance, for 15 minutes, in Prone     Assessment:    Pt c/o severe lumbar pain this visit. Pt worked on gentle lumbar ROM and manual therapy to reduce pain.     Post-Treatment Symptoms:   Response to Interventions: 5    Plan:        Goals:   Active       PT Problem       PT Goal 1       Start:  02/29/24    Expected End:  05/29/24       After 12 weeks of skilled " physical therapy....    1. Patient to demonstrate the use of proper body mechanics and posture when performing ADL's and picking up objects > 10 lbs to eliminate/reduce their symptoms for self care independence.    2. Patient to sleep uninterrupted for 6 hours without being awakened by their pain to allow them to perform ADL's and work effectively during the day.     3. Patient to be able to load and unload the  without increased pain in their low back and demonstrate proper body mechanics.    4. Decrease patient's subjective low back pain to 3/10.    5. Patient will be able to stand > 30 minutes without increased pain to allow patient to prepare meals for self care independence.    6. Patient to rotate trunk to the left to look over shoulder when driving to safely switch lanes and back up an automobile.    7. Patient to be able to load and unload her  without increased pain in their low back while demonstrating proper body mechanics for self care independence.    8. Patient will improve their Low Back Disability Score to < 20% to allow patient to carry groceries into home without symptom exacerbation.              Home

## 2024-03-28 ENCOUNTER — TREATMENT (OUTPATIENT)
Dept: PHYSICAL THERAPY | Facility: CLINIC | Age: 69
End: 2024-03-28
Payer: MEDICARE

## 2024-03-28 DIAGNOSIS — M54.41 LUMBAGO WITH SCIATICA, RIGHT SIDE: ICD-10-CM

## 2024-03-28 DIAGNOSIS — G89.29 OTHER CHRONIC PAIN: ICD-10-CM

## 2024-03-28 PROCEDURE — 97110 THERAPEUTIC EXERCISES: CPT | Mod: GP

## 2024-03-28 PROCEDURE — 97014 ELECTRIC STIMULATION THERAPY: CPT | Mod: GP

## 2024-03-28 ASSESSMENT — ENCOUNTER SYMPTOMS
LOSS OF SENSATION IN FEET: 0
OCCASIONAL FEELINGS OF UNSTEADINESS: 0
DEPRESSION: 0

## 2024-03-28 ASSESSMENT — PAIN - FUNCTIONAL ASSESSMENT: PAIN_FUNCTIONAL_ASSESSMENT: 0-10

## 2024-03-28 ASSESSMENT — PAIN SCALES - GENERAL: PAINLEVEL_OUTOF10: 5 - MODERATE PAIN

## 2024-03-28 NOTE — PROGRESS NOTES
"Physical Therapy Treatment    Patient Name: Arabella Hanks  MRN: [id number]  PT Received On: 03/28/24  Time Calculation  Start Time: 1515  Stop Time: 1610  Time Calculation (min): 55 min  PT Modalities Time Entry  E-Stim (Unattended) Time Entry: 15  PT Therapeutic Procedures Time Entry  Manual Therapy Time Entry: 10  Therapeutic Exercise Time Entry: 25  Visit Number: 4  Visits/Dates Authorized: 12    Current Problem:   1. Lumbago with sciatica, right side  Follow Up In Physical Therapy      2. Other chronic pain  Follow Up In Physical Therapy          Surgery:   Surgery date:     Precautions:        Subjective   General: Pt states        Pre-Treatment Symptoms:   Pain Assessment: 0-10  Pain Score: 5 - Moderate pain    Objective   Findings: Tenderness L lumbar paraspinals    Treatments:      Therapeutic Exercise:   Nustep lv 4 x 4 min  PPT x 15  LTR x10  Quadruped hip ext 2x10 L/R  Midback to press up x 10  Quadruped cat/cow x 10  Sciatic n. Floss/tensioners x 15  FT row 10# 2x10  FT chops 15# x15 L/R  Shuttle press dbl # x 15, x12, x10  L/R 62# 2x10  Side step Tloop orange 15 feet x 2 L/R     DNP:  Seated Tball lumbar flexion 2x15  Seated cat/cow  x10  Seated on Tball PPT, circles, 2x10 each  Pallof press red x10 L/R  Push/pull red x15  Single arm row red x 15 L/R  Hooklying hip abd light 30\" x 5 iso  Hip IR/ER x10  Tball heel slides 2x10  Tball bridge 2x5  Bridge x 5    Neuromuscular Re-Ed:       Therapeutic Activity:      Gait Training:       Manual Therapy:   Effleurage and petrissage to lumbar and thoracic paraspinals, thoracolumbar fascia. Manual trigger point release to gluteus medius and piriformis. For back pain relief.     Modalities:   Unattended e-stim: IFC: applied to lumbar, Intensity to tolerance, for 15 minutes, in Prone     Assessment:    Pt reports reduce low back pain compared to last visit but still moderate pain. Pt worked on lumbar mobility and posterior chain strengthening as well as " manual therapy for pain relief.   Post-Treatment Symptoms:   Response to Interventions: 4    Plan:        Goals:   Active       PT Problem       PT Goal 1       Start:  02/29/24    Expected End:  05/29/24       After 12 weeks of skilled physical therapy....    1. Patient to demonstrate the use of proper body mechanics and posture when performing ADL's and picking up objects > 10 lbs to eliminate/reduce their symptoms for self care independence.    2. Patient to sleep uninterrupted for 6 hours without being awakened by their pain to allow them to perform ADL's and work effectively during the day.     3. Patient to be able to load and unload the  without increased pain in their low back and demonstrate proper body mechanics.    4. Decrease patient's subjective low back pain to 3/10.    5. Patient will be able to stand > 30 minutes without increased pain to allow patient to prepare meals for self care independence.    6. Patient to rotate trunk to the left to look over shoulder when driving to safely switch lanes and back up an automobile.    7. Patient to be able to load and unload her  without increased pain in their low back while demonstrating proper body mechanics for self care independence.    8. Patient will improve their Low Back Disability Score to < 20% to allow patient to carry groceries into home without symptom exacerbation.

## 2024-04-01 ENCOUNTER — TREATMENT (OUTPATIENT)
Dept: PHYSICAL THERAPY | Facility: CLINIC | Age: 69
End: 2024-04-01
Payer: MEDICARE

## 2024-04-01 DIAGNOSIS — M54.41 LUMBAGO WITH SCIATICA, RIGHT SIDE: ICD-10-CM

## 2024-04-01 DIAGNOSIS — G89.29 OTHER CHRONIC PAIN: ICD-10-CM

## 2024-04-01 PROCEDURE — 97014 ELECTRIC STIMULATION THERAPY: CPT | Mod: GP

## 2024-04-01 PROCEDURE — 97110 THERAPEUTIC EXERCISES: CPT | Mod: GP

## 2024-04-01 ASSESSMENT — PAIN SCALES - GENERAL: PAINLEVEL_OUTOF10: 7

## 2024-04-01 ASSESSMENT — PAIN - FUNCTIONAL ASSESSMENT: PAIN_FUNCTIONAL_ASSESSMENT: 0-10

## 2024-04-01 NOTE — LETTER
April 1, 2024    Cheryl Fletcher DO  [address] [facility] [address]    Patient: Arabella Hanks   YOB: 1955   Date of Visit: 4/1/2024       Dear Cheryl Fletcher DO  [address] [place] [address]    The attached plan of care is being sent to you because your patient’s medical reimbursement requires that you certify the plan of care. Your signature is required to allow uninterrupted insurance coverage.      You may indicate your approval by signing below and faxing this form back to us at Dept Fax: [phone].    Please call Dept: [phone] with any questions or concerns.    Thank you for this referral,        Paul Montoya PT  [facility]  [facility]  [address] [facility] [id number] [phone]    Payer: Payor: MEDICAL [facility] MEDICARE / Plan: [facility] MEDICARE / Product Type: *No Product type* /                                                                         Date:     Dear Paul Montoya PT,     Re: Ms. Arabella Hanks, MRN:[id number]    I certify that I have reviewed the attached plan of care and it is medically necessary for Ms. Arabella Hanks (1955) who is under my care.          ______________________________________                    _________________  Provider name and credentials                                           Date and time                                                                                           Plan of Care 2/29/24   Effective from: 2/29/2024  Effective to: 5/29/2024    Plan ID: [id number]                Participants as of Finalize on 2/29/2024      Name Type Comments Contact Info    Cheryl Fletcher DO Primary Care Provider  [phone]    Paul Montoya PT Physical Therapist  [phone]           Last Plan Note       Author: Paul Montoya PT Status: Sign when Signing Visit Last edited: 2/29/2024  8:15 AM         Physical Therapy Evaluation and  Treatment     Patient Name: Arabella Hanks  MRN: [id number]  PT Received On: 24  Time Calculation  Start Time: 0815  Stop Time: 0900  Time Calculation (min): 45 min  PT Evaluation Time Entry  PT Evaluation (Moderate) Time Entry: 30  PT Modalities Time Entry  E-Stim (Unattended) Time Entry: 15    Current Problem:   M54.41 (ICD-10-CM) - Lumbago with sciatica, right side   G89.29 (ICD-10-CM) - Other chronic pain     Precautions:       Subjective Evaluation    History of Present Illness  Date of onset: 2024  Mechanism of injury: 67 yo female well known to this PT clinic presents with chronic low back pain, R buttock pain . Pt states her pain worsens with activity, walking, or prolonged standing. Pt also c/o R shoulder pain and lower neck pain that bother her. Pt has had relief in the past with exercise and dry needling or electric stimulation. Per pt she recently had a vein procedure done on her lower legs. She also has been battling high blood pressure and was put on medication.     Pain  Current pain ratin  At best pain ratin  At worst pain ratin  Location: constant throbbing, shooting pain into R buttock  Quality: throbbing  Relieving factors: change in position  Aggravating factors: movement  Progression: worsening    Treatments  Previous treatment: physical therapy and chiropractic  Patient Goals  Patient goals for therapy: increased strength, decreased pain and increased motion           Objective     Palpation     Right   Hypertonic in the gluteus medius and TFL. Tenderness of the gluteus medius and TFL.     Tenderness     Left Hip   No tenderness in the PSIS.     Right Hip   Tenderness in the PSIS.     Strength/Myotome Testing     Left Hip   Planes of Motion   Flexion: 4  Extension: 4-  Abduction: 4-  Adduction: 4  External rotation: 4  Internal rotation: 4    Right Hip   Planes of Motion   Flexion: 4  Extension: 4-  Abduction: 4-  Adduction: 4  External rotation: 4  Internal rotation:  4    Left Knee   Flexion: 4  Extension: 4    Right Knee   Flexion: 4  Extension: 4    Left Ankle/Foot   Dorsiflexion: 4  Plantar flexion: 5  Great toe extension: 5    Right Ankle/Foot   Dorsiflexion: 4  Plantar flexion: 5  Great toe extension: 5    Ambulation     Observational Gait   Gait: within functional limits   Walking speed and stride length within functional limits.             Other Measures  Other Outcome Measures: Low back Disability Questionnaire: 36% impaired    Treatments:     Therapeutic Exercise:   PPT x 5  Bridge x 5  LTR x 5  Open book L/R x 5      Neuromuscular Re-Ed:     Gait Training:     Manual Therapy:     Modalities:   Dry needling following informed consent: with e-stim; 4Hz, mA to tolerance, applied to lumbar and R gluteus medius/TFL, for 15 minutes.      Assessment & Plan     Assessment  Impairments: abnormal muscle tone, abnormal or restricted ROM, activity intolerance, impaired physical strength, lacks appropriate home exercise program and pain with function  Assessment details: Patient presents with low back pain with referred RLE pain. Key impairments include: decreased ROM and strength, postural deficits and pain with functional activities such as standing, walking, and bending. Patient would benefit from skilled physical therapy services to address these impairments and restore normal ROM and strength to reduce pain and improving activity participation.    Prognosis: good    Plan  Therapy options: will be seen for skilled physical therapy services  Planned modality interventions: TENS, electrical stimulation/Russian stimulation, cryotherapy and thermotherapy (hydrocollator packs)  Planned therapy interventions: manual therapy, spinal/joint mobilization, soft tissue mobilization, stretching, strengthening, home exercise program, functional ROM exercises, neuromuscular re-education and postural training  Frequency: 2x week  Duration in visits: 12  Duration in weeks: 10  Treatment plan  discussed with: patient  Plan details: 1-2x week for 12 visits       Moderate complexity due to patient's clinical presentation being evolving with changing characteristics, with comorbidities to include migraines, chronic back pain, HTN, RA, and CKD, all of which may negatively impact rehab tolerance and progression.     Post-Treatment Pain:  Response to Interventions: 5    Goals:   Active       PT Problem       PT Goal 1       Start:  02/29/24    Expected End:  05/29/24       After 12 weeks of skilled physical therapy....    1. Patient to demonstrate the use of proper body mechanics and posture when performing ADL's and picking up objects > 10 lbs to eliminate/reduce their symptoms for self care independence.    2. Patient to sleep uninterrupted for 6 hours without being awakened by their pain to allow them to perform ADL's and work effectively during the day.     3. Patient to be able to load and unload the  without increased pain in their low back and demonstrate proper body mechanics.    4. Decrease patient's subjective low back pain to 3/10.    5. Patient will be able to stand > 30 minutes without increased pain to allow patient to prepare meals for self care independence.    6. Patient to rotate trunk to the left to look over shoulder when driving to safely switch lanes and back up an automobile.    7. Patient to be able to load and unload her  without increased pain in their low back while demonstrating proper body mechanics for self care independence.    8. Patient will improve their Low Back Disability Score to < 20% to allow patient to carry groceries into home without symptom exacerbation.                  Current Participants as of 4/1/2024      Name Type Comments Contact Info    Cheryl Fletcher DO Primary Care Provider  [phone]    Signature pending    Paul Montoya PT Physical Therapist  [phone]    Electronically signed by Paul Montoya PT at 2/29/2024 0258  EST

## 2024-04-01 NOTE — PROGRESS NOTES
"Physical Therapy Treatment    Patient Name: Arabella Hanks  MRN: [id number]  PT Received On: 04/01/24  Time Calculation  Start Time: 1500  Stop Time: 1600  Time Calculation (min): 60 min  PT Modalities Time Entry  E-Stim (Unattended) Time Entry: 15  PT Therapeutic Procedures Time Entry  Therapeutic Exercise Time Entry: 39  Visit Number: 5  Visits/Dates Authorized: 12    Current Problem:   1. Lumbago with sciatica, right side  Follow Up In Physical Therapy      2. Other chronic pain  Follow Up In Physical Therapy          Surgery:   Surgery date:     Precautions:        Subjective   General: Pt states she lost her balance and was reaching back to prevent her fall and now her neck, upper back and R arm are pretty sore today. Low back is also sore.        Pre-Treatment Symptoms:   Pain Assessment: 0-10  Pain Score: 7Back 6/10  Neck/upper back 8/10    Objective   Findings: Tenderness L lumbar paraspinals    Shoulder flexion/ER/abduction WFL but painful with active motion  Shoulder resisted ER/flex 4-/5, abd 3+/5-painful        Treatments:      Therapeutic Exercise:   Pulleys x 2 min shld flex  Wall slides x 10  Seated thoracic opener x 10  Supine shld AAROM flex x15  Supine shld ER/IR AROM x 15  Quadruped weight shift fwd/back x 10  Quadruped hip ext 2x10 L/R  Seated Sciatic n. Floss/tensioners x 15  Sit to stand 2x10  Standing hip abd L/R 2x10, ext 2x10  Prone hip ext 2x10  Prone shld ext for upper back strengthening 2x10    DNP:  Shuttle press dbl # x 15, x12, x10  L/R 62# 2x10  Side step Tloop orange 15 feet x 2 L/R   Seated Tball lumbar flexion 2x15  Seated cat/cow  x10  Seated on Tball PPT, circles, 2x10 each  Pallof press red x10 L/R  Push/pull red x15  Single arm row red x 15 L/R  Hooklying hip abd light 30\" x 5 iso  Hip IR/ER x10  Tball heel slides 2x10  Tball bridge 2x5  Bridge x 5    Neuromuscular Re-Ed:     Therapeutic Activity:  Gait Training:   Manual Therapy:   DNP:  Effleurage and petrissage to " lumbar and thoracic paraspinals, thoracolumbar fascia. Manual trigger point release to gluteus medius and piriformis. For back pain relief.     Modalities:     Unattended e-stim: IFC: applied to lumbar, Intensity to tolerance, for 15 minutes, in Prone -for pain relief    Assessment:    Pt reporting upper back, neck and R shoulder pain after losing her balance over weekend and stopping herself from falling. 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 Pt continue to work on posterior chain strengthening to reduce pain and light upper back/shoulder ROM this visit to reduce pain.     Post-Treatment Symptoms:   Response to Interventions: 55/10 shoulder, neck/low back  Plan:        Goals:   Active       PT Problem       PT Goal 1       Start:  02/29/24    Expected End:  05/29/24       After 12 weeks of skilled physical therapy....    1. Patient to demonstrate the use of proper body mechanics and posture when performing ADL's and picking up objects > 10 lbs to eliminate/reduce their symptoms for self care independence.    2. Patient to sleep uninterrupted for 6 hours without being awakened by their pain to allow them to perform ADL's and work effectively during the day.     3. Patient to be able to load and unload the  without increased pain in their low back and demonstrate proper body mechanics.    4. Decrease patient's subjective low back pain to 3/10.    5. Patient will be able to stand > 30 minutes without increased pain to allow patient to prepare meals for self care independence.    6. Patient to rotate trunk to the left to look over shoulder when driving to safely switch lanes and back up an automobile.    7. Patient to be able to load and unload her  without increased pain in their low back while demonstrating proper body mechanics for self care independence.    8. Patient will improve their Low Back Disability Score to < 20% to allow patient to carry groceries  into home without symptom exacerbation.

## 2024-04-04 ENCOUNTER — TREATMENT (OUTPATIENT)
Dept: PHYSICAL THERAPY | Facility: CLINIC | Age: 69
End: 2024-04-04
Payer: MEDICARE

## 2024-04-04 DIAGNOSIS — M54.41 LUMBAGO WITH SCIATICA, RIGHT SIDE: ICD-10-CM

## 2024-04-04 DIAGNOSIS — G89.29 OTHER CHRONIC PAIN: ICD-10-CM

## 2024-04-04 PROCEDURE — 97110 THERAPEUTIC EXERCISES: CPT | Mod: GP

## 2024-04-04 PROCEDURE — 97014 ELECTRIC STIMULATION THERAPY: CPT | Mod: GP

## 2024-04-04 ASSESSMENT — PAIN SCALES - GENERAL: PAINLEVEL_OUTOF10: 7

## 2024-04-04 ASSESSMENT — PAIN - FUNCTIONAL ASSESSMENT: PAIN_FUNCTIONAL_ASSESSMENT: 0-10

## 2024-04-04 NOTE — PROGRESS NOTES
"Physical Therapy Treatment    Patient Name: Arabella Hanks  MRN: [id number]  PT Received On: 04/04/24  Time Calculation  Start Time: 1325  Stop Time: 1435  Time Calculation (min): 70 min  PT Modalities Time Entry  E-Stim (Unattended) Time Entry: 15  PT Therapeutic Procedures Time Entry  Manual Therapy Time Entry: 5  Therapeutic Exercise Time Entry: 30  Visit Number: 6  Visits/Dates Authorized: 12    Current Problem:   1. Lumbago with sciatica, right side  Follow Up In Physical Therapy      2. 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     Pre-Treatment Symptoms:   Pain Assessment: 0-10  Pain Score: 7Back 6/10  Neck/upper back 7/10    Objective   Findings: Tenderness L lumbar paraspinals    R Shoulder flexion/ER/abduction WFL but painful with active motion  R Shoulder resisted ER/flex 4-/5, abd 3+/5-painful        Treatments:      Therapeutic Exercise:   Tball heel slides 2x10  LTR x 10  Clamshell L/R x 15 light  Rev clamshell x 10 L/R  Nustep lv 4 x 4min  Wall slides w/ lift off x 10  Sidelying shoulder abd + ER x10  Quadruped weight shift fwd/back x 10  Sit to stand 2x10  Shuttle press dbl # x 15, x12, x10  L/R 62# 2x10  Leg curl 45# 2x10  FT row 5# x 8 painful  FT shld ext 5# 2x10  Banded shld ER pink 2x10  Banded shld abd iso 10\" x 5  Tloop side step orange 15 feet x 4  Seated good mornings 8# x 10      DNP:  Side step Tloop orange 15 feet x 2 L/R   Seated Tball lumbar flexion 2x15  Seated cat/cow  x10  Seated on Tball PPT, circles, 2x10 each  Pallof press red x10 L/R  Push/pull red x15  Single arm row red x 15 L/R  Hooklying hip abd light 30\" x 5 iso  Hip IR/ER x10  Tball bridge 2x5  Bridge x 5    Neuromuscular Re-Ed:     Therapeutic Activity:  Gait Training:   Manual Therapy:   Effleurage and petrissage to lumbar and thoracic " paraspinals, thoracolumbar fascia. Manual trigger point release to gluteus medius and piriformis. For back pain relief.     Modalities:     Unattended e-stim: IFC: applied to lumbar, Intensity to tolerance, for 15 minutes, in Prone -for pain relief    Assessment:    Pt reporting some shoulder pain relief compared to last visit however sleeping is still difficult. 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 Pt worked on upper and lower back strengthening to reduce pain and improve lumbar stability.     Post-Treatment Symptoms:   Response to Interventions: 55/10 shoulder, neck/low back  Plan:        Goals:   Active       PT Problem       PT Goal 1       Start:  02/29/24    Expected End:  05/29/24       After 12 weeks of skilled physical therapy....    1. Patient to demonstrate the use of proper body mechanics and posture when performing ADL's and picking up objects > 10 lbs to eliminate/reduce their symptoms for self care independence.    2. Patient to sleep uninterrupted for 6 hours without being awakened by their pain to allow them to perform ADL's and work effectively during the day.     3. Patient to be able to load and unload the  without increased pain in their low back and demonstrate proper body mechanics.    4. Decrease patient's subjective low back pain to 3/10.    5. Patient will be able to stand > 30 minutes without increased pain to allow patient to prepare meals for self care independence.    6. Patient to rotate trunk to the left to look over shoulder when driving to safely switch lanes and back up an automobile.    7. Patient to be able to load and unload her  without increased pain in their low back while demonstrating proper body mechanics for self care independence.    8. Patient will improve their Low Back Disability Score to < 20% to allow patient to carry groceries into home without symptom exacerbation.

## 2024-04-08 ENCOUNTER — APPOINTMENT (OUTPATIENT)
Dept: PHYSICAL THERAPY | Facility: CLINIC | Age: 69
End: 2024-04-08
Payer: MEDICARE

## 2024-04-11 ENCOUNTER — TREATMENT (OUTPATIENT)
Dept: PHYSICAL THERAPY | Facility: CLINIC | Age: 69
End: 2024-04-11
Payer: MEDICARE

## 2024-04-11 DIAGNOSIS — G89.29 OTHER CHRONIC PAIN: ICD-10-CM

## 2024-04-11 DIAGNOSIS — M54.41 LUMBAGO WITH SCIATICA, RIGHT SIDE: ICD-10-CM

## 2024-04-11 PROCEDURE — 97140 MANUAL THERAPY 1/> REGIONS: CPT | Mod: GP

## 2024-04-11 PROCEDURE — 97110 THERAPEUTIC EXERCISES: CPT | Mod: GP

## 2024-04-11 ASSESSMENT — PAIN SCALES - GENERAL: PAINLEVEL_OUTOF10: 7

## 2024-04-11 ASSESSMENT — PAIN - FUNCTIONAL ASSESSMENT: PAIN_FUNCTIONAL_ASSESSMENT: 0-10

## 2024-04-11 NOTE — PROGRESS NOTES
"Physical Therapy Treatment    Patient Name: Arabella Hanks  MRN: [id number]  PT Received On: 04/11/24  Time Calculation  Start Time: 1415  Stop Time: 1500  Time Calculation (min): 45 min  PT Therapeutic Procedures Time Entry  Manual Therapy Time Entry: 15  Therapeutic Exercise Time Entry: 20  Therapeutic Activity Time Entry: 5  Visit Number: 7  Visits/Dates Authorized: 12    Current Problem:   1. Lumbago with sciatica, right side  Follow Up In Physical Therapy      2. Other chronic pain  Follow Up In Physical Therapy          Surgery:   Surgery date:     Precautions:        Subjective   General:  Pt says her R shoulder/neck/back are all sore. Pt received a home TENs unit to use on her cruise.     Pre-Treatment Symptoms:   Pain Assessment: 0-10  Pain Score: 7Back 6/10  Neck/upper back 7/10    Objective   Findings: Tenderness L lumbar paraspinals    R Shoulder flexion/ER/abduction WFL but painful with active motion  R Shoulder resisted ER/flex 4-/5, abd 3+/5-painful    Treatments:      Therapeutic Exercise:   Pulleys x 3 min flex  Wall slides 2x10  Tloop row red 2x10  Dbl ER iso pink 10\" x 5  Dbl ER w/ reach pink x 10  Tball heel slides 2x10  LTR x 10  PPT x 10    DNP:  Wall slides w/ lift off x 10  Sidelying shoulder abd + ER x10  Quadruped weight shift fwd/back x 10  Sit to stand 2x10  Shuttle press dbl # x 15, x12, x10  L/R 62# 2x10  Leg curl 45# 2x10  FT row 5# x 8 painful  FT shld ext 5# 2x10  Side step Tloop orange 15 feet x 2 L/R   Seated Tball lumbar flexion 2x15  Seated cat/cow  x10  Seated on Tball PPT, circles, 2x10 each  Pallof press red x10 L/R  Push/pull red x15  Single arm row red x 15 L/R  Hooklying hip abd light 30\" x 5 iso  Hip IR/ER x10  Tball bridge 2x5  Bridge x 5    Neuromuscular Re-Ed:     Therapeutic Activity: Pt educated on how to use home TENs unit. Proper pad placement, precautions, how to use device etc.     Gait Training:   Manual Therapy:   Effleurage and petrissage to lumbar and " thoracic paraspinals, thoracolumbar fascia. Manual trigger point release to gluteus medius and piriformis. PROM R shoulder. MET for cervical rotation/ext/SB L/R.     Modalities:       Assessment:    Pt still having neck/back/shoulder pain. Treatment today was focused pain relief. Pt felt better after manual therapy. Pt educated on using her newly obtained home TENs unit.     Post-Treatment Symptoms:   Response to Interventions: 44/10 shoulder, neck/low back  Plan:        Goals:   Active       PT Problem       PT Goal 1       Start:  02/29/24    Expected End:  05/29/24       After 12 weeks of skilled physical therapy....    1. Patient to demonstrate the use of proper body mechanics and posture when performing ADL's and picking up objects > 10 lbs to eliminate/reduce their symptoms for self care independence.    2. Patient to sleep uninterrupted for 6 hours without being awakened by their pain to allow them to perform ADL's and work effectively during the day.     3. Patient to be able to load and unload the  without increased pain in their low back and demonstrate proper body mechanics.    4. Decrease patient's subjective low back pain to 3/10.    5. Patient will be able to stand > 30 minutes without increased pain to allow patient to prepare meals for self care independence.    6. Patient to rotate trunk to the left to look over shoulder when driving to safely switch lanes and back up an automobile.    7. Patient to be able to load and unload her  without increased pain in their low back while demonstrating proper body mechanics for self care independence.    8. Patient will improve their Low Back Disability Score to < 20% to allow patient to carry groceries into home without symptom exacerbation.

## 2024-04-15 ENCOUNTER — TREATMENT (OUTPATIENT)
Dept: PHYSICAL THERAPY | Facility: CLINIC | Age: 69
End: 2024-04-15
Payer: MEDICARE

## 2024-04-15 ENCOUNTER — OFFICE VISIT (OUTPATIENT)
Dept: ORTHOPEDIC SURGERY | Facility: CLINIC | Age: 69
End: 2024-04-15
Payer: MEDICARE

## 2024-04-15 VITALS — HEIGHT: 67 IN | BODY MASS INDEX: 27.47 KG/M2 | WEIGHT: 175 LBS

## 2024-04-15 DIAGNOSIS — M72.0 DUPUYTREN CONTRACTURE: Primary | ICD-10-CM

## 2024-04-15 DIAGNOSIS — M65.30 TRIGGER FINGER, ACQUIRED: ICD-10-CM

## 2024-04-15 DIAGNOSIS — G89.29 OTHER CHRONIC PAIN: ICD-10-CM

## 2024-04-15 DIAGNOSIS — M54.41 LUMBAGO WITH SCIATICA, RIGHT SIDE: ICD-10-CM

## 2024-04-15 PROCEDURE — 1160F RVW MEDS BY RX/DR IN RCRD: CPT | Performed by: ORTHOPAEDIC SURGERY

## 2024-04-15 PROCEDURE — 1159F MED LIST DOCD IN RCRD: CPT | Performed by: ORTHOPAEDIC SURGERY

## 2024-04-15 PROCEDURE — 1036F TOBACCO NON-USER: CPT | Performed by: ORTHOPAEDIC SURGERY

## 2024-04-15 PROCEDURE — 97014 ELECTRIC STIMULATION THERAPY: CPT | Mod: GP

## 2024-04-15 PROCEDURE — 99214 OFFICE O/P EST MOD 30 MIN: CPT | Performed by: ORTHOPAEDIC SURGERY

## 2024-04-15 PROCEDURE — 97110 THERAPEUTIC EXERCISES: CPT | Mod: GP

## 2024-04-15 RX ORDER — AMLODIPINE BESYLATE 5 MG/1
TABLET ORAL EVERY 24 HOURS
COMMUNITY

## 2024-04-15 RX ORDER — VIT C/E/ZN/COPPR/LUTEIN/ZEAXAN 250MG-90MG
1000 CAPSULE ORAL
COMMUNITY

## 2024-04-15 RX ORDER — PRAMIPEXOLE DIHYDROCHLORIDE 0.12 MG/1
0.12 TABLET ORAL NIGHTLY
COMMUNITY

## 2024-04-15 ASSESSMENT — PAIN - FUNCTIONAL ASSESSMENT: PAIN_FUNCTIONAL_ASSESSMENT: 0-10

## 2024-04-15 ASSESSMENT — PAIN SCALES - GENERAL: PAINLEVEL_OUTOF10: 3

## 2024-04-15 NOTE — PROGRESS NOTES
"Physical Therapy Treatment    Patient Name: Arabella Hanks  MRN: [id number]  PT Received On: 04/15/24  Time Calculation  Start Time: 1420  Stop Time: 1520  Time Calculation (min): 60 min  PT Modalities Time Entry  E-Stim (Unattended) Time Entry: 15  PT Therapeutic Procedures Time Entry  Therapeutic Exercise Time Entry: 29  Visit Number: 8  Visits/Dates Authorized: 12    Current Problem:   1. Lumbago with sciatica, right side  Follow Up In Physical Therapy      2. Other chronic pain  Follow Up In Physical Therapy          Surgery:   Surgery date:     Precautions:        Subjective   General:  Pt states using her home TENs unit has been going well. Neck and shoulder still sore. Back is feeling better today.     Pre-Treatment Symptoms:   Pain Assessment: 0-10  Pain Score: 3Back 3/10  Neck/upper back 6/10    Objective   Findings: Tenderness L lumbar paraspinals    R Shoulder flexion/ER/abduction WFL but painful with active motion  R Shoulder resisted ER/flex 4-/5, abd 3+/5-painful    Treatments:      Therapeutic Exercise:   Pulleys x 3 min flex  Wall slides 2x10  Dbl ER iso pink 10\" x 5  Dbl ER w/ reach pink x 10  Tball heel slides 2x10  LTR x 10  Tball bridge x 10  Bridge x10  Prone hip ext 2x12 L/R  Prone back ext 2x5  Rev curl up x 10    DNP:  Wall slides w/ lift off x 10  Sidelying shoulder abd + ER x10  Quadruped weight shift fwd/back x 10  Sit to stand 2x10  Shuttle press dbl # x 15, x12, x10  L/R 62# 2x10  Leg curl 45# 2x10  FT row 5# x 8 painful  FT shld ext 5# 2x10  Side step Tloop orange 15 feet x 2 L/R   Seated Tball lumbar flexion 2x15  Seated cat/cow  x10  Seated on Tball PPT, circles, 2x10 each  Pallof press red x10 L/R  Push/pull red x15  Single arm row red x 15 L/R  Hooklying hip abd light 30\" x 5 iso  Hip IR/ER x10  Tball bridge 2x5  Bridge x 5    Neuromuscular Re-Ed:     Therapeutic Activity:   Gait Training:   Manual Therapy:     Modalities:   Dry needling following informed consent: with " e-stim; 4Hz, mA to tolerance, applied to lumbar paraspinals and gluteus medius, for 10 minutes.  Pt L sidelying.      Assessment:    Pt reporting less back pain today. Pt continues to work on posterior chain strengthening for her upper and lower back.    Post-Treatment Symptoms:   Response to Interventions: 33/10 shoulder, neck/low back  Plan:        Goals:   Active       PT Problem       PT Goal 1       Start:  02/29/24    Expected End:  05/29/24       After 12 weeks of skilled physical therapy....    1. Patient to demonstrate the use of proper body mechanics and posture when performing ADL's and picking up objects > 10 lbs to eliminate/reduce their symptoms for self care independence.    2. Patient to sleep uninterrupted for 6 hours without being awakened by their pain to allow them to perform ADL's and work effectively during the day.     3. Patient to be able to load and unload the  without increased pain in their low back and demonstrate proper body mechanics.    4. Decrease patient's subjective low back pain to 3/10.    5. Patient will be able to stand > 30 minutes without increased pain to allow patient to prepare meals for self care independence.    6. Patient to rotate trunk to the left to look over shoulder when driving to safely switch lanes and back up an automobile.    7. Patient to be able to load and unload her  without increased pain in their low back while demonstrating proper body mechanics for self care independence.    8. Patient will improve their Low Back Disability Score to < 20% to allow patient to carry groceries into home without symptom exacerbation.

## 2024-04-16 PROCEDURE — 20550 NJX 1 TENDON SHEATH/LIGAMENT: CPT | Performed by: ORTHOPAEDIC SURGERY

## 2024-04-16 RX ORDER — TRIAMCINOLONE ACETONIDE 40 MG/ML
40 INJECTION, SUSPENSION INTRA-ARTICULAR; INTRAMUSCULAR
Status: COMPLETED | OUTPATIENT
Start: 2024-04-16 | End: 2024-04-16

## 2024-04-16 RX ORDER — LIDOCAINE HYDROCHLORIDE 10 MG/ML
0.5 INJECTION INFILTRATION; PERINEURAL
Status: COMPLETED | OUTPATIENT
Start: 2024-04-16 | End: 2024-04-16

## 2024-04-16 RX ADMIN — LIDOCAINE HYDROCHLORIDE 0.5 ML: 10 INJECTION INFILTRATION; PERINEURAL at 06:53

## 2024-04-16 RX ADMIN — TRIAMCINOLONE ACETONIDE 40 MG: 40 INJECTION, SUSPENSION INTRA-ARTICULAR; INTRAMUSCULAR at 06:53

## 2024-04-16 NOTE — PROGRESS NOTES
"History of Present Illness:  Chief Complaint   Patient presents with    Right Hand - Follow-up     Right ring finger trigger    Left Hand - Follow-up     Lt ring finger trigger       Patient presents today for evaluation of bilateral ring finger pain. Endorses catching of the digit with flexion. Denies any traumatic event or injury. The patient notes that it is worse with periods of disuse and in the morning.  Somewhat better with rest.  The patient endorses sharp focal pain when it catches.  The patient denies any numbness and tingling.  She has had 3 previous injections on the right and 2 injections for the left.  Her last injection on the right side was in September 2023.  No numbness or tingling.    Past Medical History:   Diagnosis Date    Personal history of other specified conditions     History of multiple pulmonary nodules       Medication Documentation Review Audit       Reviewed by Courtney Lugo MA (Medical Assistant) on 04/15/24 at 1204      Medication Order Taking? Sig Documenting Provider Last Dose Status   amLODIPine (Norvasc) 5 mg tablet [id number]  Take by mouth once every 24 hours. Historical Provider, MD  Active   cholecalciferol (Vitamin D-3) 25 MCG (1000 UT) capsule [id number]  Take 1 capsule (25 mcg) by mouth once daily. Historical Provider, MD  Active   omeprazole (PriLOSEC) 40 mg DR capsule [id number]  TAKE ONE CAPSULE BY MOUTH DAILY IN THE MORNING 30 MINUTES BEFORE BREAKFAST Rajni Perez PA-C  Active   omeprazole (PriLOSEC) 40 mg DR capsule [id number]  Take 1 capsule (40 mg) by mouth once daily in the morning. Take before meals. Historical Provider, MD  Active   pramipexole (Mirapex) 0.125 mg tablet [id number]  Take 1 tablet (0.125 mg) by mouth once daily at bedtime. Historical Provider, MD  Active                    Allergies   Allergen Reactions    Aluminum Aspirin Unknown    Aspirin Unknown and Other     \"had blood clot at 16 and told not to take\"    Codeine Unknown    Lisinopril Cough " and Hives    Nsaids (Non-Steroidal Anti-Inflammatory Drug) Other     Stage 3 kidney disease       Social History     Socioeconomic History    Marital status:      Spouse name: Not on file    Number of children: Not on file    Years of education: Not on file    Highest education level: Not on file   Occupational History    Not on file   Tobacco Use    Smoking status: Never    Smokeless tobacco: Never   Substance and Sexual Activity    Alcohol use: Not Currently    Drug use: Never    Sexual activity: Defer   Other Topics Concern    Not on file   Social History Narrative    Not on file     Social Determinants of Health     Financial Resource Strain: Not on file   Food Insecurity: Not on file   Transportation Needs: Not on file   Physical Activity: Not on file   Stress: Not on file   Social Connections: Not on file   Intimate Partner Violence: Not on file   Housing Stability: Not on file       Past Surgical History:   Procedure Laterality Date    APPENDECTOMY  03/21/2017    Appendectomy    BACK SURGERY  03/21/2017    Back Surgery    BI STEREOTACTIC GUIDED BREAST LEFT LOCALIZATION AND BIOPSY Left 6/14/2016    BI STEREOTACTIC GUIDED BREAST LOCALIZATION AND BIOPSY LEFT [facility]    HYSTERECTOMY  03/21/2017    Hysterectomy    OTHER SURGICAL HISTORY  03/21/2017    Hand Repair          Review of Systems   GENERAL: Negative for malaise, significant weight loss, fever  MUSCULOSKELETAL: see HPI  NEURO:  Negative     Physical Examination  Constitutional: Appears well-developed and well-nourished.  Head: Normocephalic and atraumatic.  Eyes: EOMI grossly  Cardiovascular: Intact distal pulses.   Respiratory: Effort normal. No respiratory distress.  Neurologic: Alert and oriented to person, place, and time.  Skin: Skin is warm and dry.  Hematologic / Lymphatic: No lymphedema, lymphangitis.  Psychiatric: normal mood and affect. Behavior is normal.   Musculoskeletal:  bilateral hand  No obvious atrophy, no skin  changes  Tenderness, palpable nodule along the ring finger flexor tendon sheath  Palpable catching/crepitus with active flexion and extension   No subluxation of the extensor tendon appreciated over the MP/PIP joints.  Sensation intact distally.  Capillary refill less than 2 seconds distally.  Palpable Dupuytren's cords in line with bilateral ring fingers without associated contractures.     Assessment:  Patient with bilateral ring finger trigger fingers.  Also with newer onset Dupuytren's disease without associated contractures at this time     Plan:  Nature of the diagnosis was discussed with the patient.  We also discussed the risks and benefits of treatment options for trigger finger.  These include splinting, corticosteroid injections and, if conservative options fail, surgical release.  She has already failed multiple previous corticosteroid injections.  We did discuss risks associated with repeat injections including potential injury to tendons that can be challenging to treat.  As she is going on a trip within the next couple days she would like to proceed with injection at this time.  We did also discuss potential for worsening of Dupuytren's tissue that could potentially be further aggravated by interventions for trigger finger.    Patient ID: Arabella Hanks is a 68 y.o. female.    Hand / UE Inj/Asp: bilateral ring A1 for trigger finger on 4/16/2024 6:53 AM  Indications: pain (triggering)  Details: 25 G needle, volar approach  Medications (Right): 40 mg triamcinolone acetonide 40 mg/mL; 0.5 mL lidocaine 10 mg/mL (1 %)  Medications (Left): 40 mg triamcinolone acetonide 40 mg/mL; 0.5 mL lidocaine 10 mg/mL (1 %)  Outcome: tolerated well, no immediate complications  Procedure, treatment alternatives, risks and benefits explained, specific risks discussed. Consent was given by the patient. Immediately prior to procedure a time out was called to verify the correct patient, procedure, equipment, support staff  and site/side marked as required.

## 2024-04-22 ENCOUNTER — APPOINTMENT (OUTPATIENT)
Dept: PHYSICAL THERAPY | Facility: CLINIC | Age: 69
End: 2024-04-22
Payer: MEDICARE

## 2024-05-02 ENCOUNTER — LAB (OUTPATIENT)
Dept: LAB | Facility: LAB | Age: 69
End: 2024-05-02
Payer: MEDICARE

## 2024-05-02 ENCOUNTER — TREATMENT (OUTPATIENT)
Dept: PHYSICAL THERAPY | Facility: CLINIC | Age: 69
End: 2024-05-02
Payer: MEDICARE

## 2024-05-02 DIAGNOSIS — N18.30 CHRONIC KIDNEY DISEASE, STAGE 3 UNSPECIFIED (MULTI): Primary | ICD-10-CM

## 2024-05-02 DIAGNOSIS — M54.41 LUMBAGO WITH SCIATICA, RIGHT SIDE: ICD-10-CM

## 2024-05-02 DIAGNOSIS — G89.29 OTHER CHRONIC PAIN: ICD-10-CM

## 2024-05-02 LAB
ALBUMIN SERPL BCP-MCNC: 4 G/DL (ref 3.4–5)
ANION GAP SERPL CALC-SCNC: 14 MMOL/L (ref 10–20)
BUN SERPL-MCNC: 26 MG/DL (ref 6–23)
CALCIUM SERPL-MCNC: 8.8 MG/DL (ref 8.6–10.3)
CHLORIDE SERPL-SCNC: 106 MMOL/L (ref 98–107)
CO2 SERPL-SCNC: 25 MMOL/L (ref 21–32)
CREAT SERPL-MCNC: 1.38 MG/DL (ref 0.5–1.05)
EGFRCR SERPLBLD CKD-EPI 2021: 41 ML/MIN/1.73M*2
ERYTHROCYTE [DISTWIDTH] IN BLOOD BY AUTOMATED COUNT: 13.5 % (ref 11.5–14.5)
GLUCOSE SERPL-MCNC: 102 MG/DL (ref 74–99)
HCT VFR BLD AUTO: 41 % (ref 36–46)
HGB BLD-MCNC: 12.9 G/DL (ref 12–16)
MCH RBC QN AUTO: 29.7 PG (ref 26–34)
MCHC RBC AUTO-ENTMCNC: 31.5 G/DL (ref 32–36)
MCV RBC AUTO: 94 FL (ref 80–100)
NRBC BLD-RTO: 0 /100 WBCS (ref 0–0)
PHOSPHATE SERPL-MCNC: 3.5 MG/DL (ref 2.5–4.9)
PLATELET # BLD AUTO: 306 X10*3/UL (ref 150–450)
POTASSIUM SERPL-SCNC: 3.9 MMOL/L (ref 3.5–5.3)
RBC # BLD AUTO: 4.35 X10*6/UL (ref 4–5.2)
SODIUM SERPL-SCNC: 141 MMOL/L (ref 136–145)
WBC # BLD AUTO: 7.9 X10*3/UL (ref 4.4–11.3)

## 2024-05-02 PROCEDURE — 97110 THERAPEUTIC EXERCISES: CPT | Mod: GP

## 2024-05-02 PROCEDURE — 80069 RENAL FUNCTION PANEL: CPT

## 2024-05-02 PROCEDURE — 97014 ELECTRIC STIMULATION THERAPY: CPT | Mod: GP

## 2024-05-02 PROCEDURE — 85027 COMPLETE CBC AUTOMATED: CPT

## 2024-05-02 PROCEDURE — 36415 COLL VENOUS BLD VENIPUNCTURE: CPT

## 2024-05-02 PROCEDURE — 83970 ASSAY OF PARATHORMONE: CPT

## 2024-05-02 ASSESSMENT — PAIN - FUNCTIONAL ASSESSMENT: PAIN_FUNCTIONAL_ASSESSMENT: 0-10

## 2024-05-02 ASSESSMENT — PAIN SCALES - GENERAL: PAINLEVEL_OUTOF10: 3

## 2024-05-02 NOTE — PROGRESS NOTES
Physical Therapy Treatment    Patient Name: Arabella Hanks  MRN: [id number]  PT Received On: 05/02/24  Time Calculation  Start Time: 1445  Stop Time: 1535  Time Calculation (min): 50 min  PT Modalities Time Entry  E-Stim (Unattended) Time Entry: 15  PT Therapeutic Procedures Time Entry  Therapeutic Exercise Time Entry: 31  Visit Number: 9  Visits/Dates Authorized: 12    Current Problem:   1. Lumbago with sciatica, right side  Follow Up In Physical Therapy      2. Other chronic pain  Follow Up In Physical Therapy          Surgery:   Surgery date:     Precautions:        Subjective   General:  Pt back from her three week vacation. Pt states she used her home TENs unit on her plane ride.     Pre-Treatment Symptoms:   Pain Assessment: 0-10  Pain Score: 3Back 3/10  Neck/upper back 6/10    Objective   Findings: Tenderness L lumbar paraspinals    R Shoulder flexion/ER/abduction WFL but painful with active motion  R Shoulder resisted ER/flex 4-/5, abd 3+/5-painful    Treatments:      Therapeutic Exercise:   Tball heel slides 2x10  Tball bridge x 10  Bridge x 10  LTR x 10  Prone hip ext 2x12 L/R  Prone back ext 2x8  Open book L/R x 10  Leg curl 50# 2x15  Hooklying hip abd light x15  Shuttle press dbl 75# x 15, 125# x 10  L/R 75# x10, 87# x 10  Hooklying hip flex x 10 L/R  KB deadlift onto 7 inch step 2x10        DNP:  Wall slides w/ lift off x 10  Sidelying shoulder abd + ER x10  Quadruped weight shift fwd/back x 10  Sit to stand 2x10  Shuttle press dbl # x 15, x12, x10  L/R 62# 2x10  FT row 5# x 8 painful  FT shld ext 5# 2x10  Side step Tloop orange 15 feet x 2 L/R   Seated Tball lumbar flexion 2x15  Seated cat/cow  x10  Seated on Tball PPT, circles, 2x10 each  Pallof press red x10 L/R  Push/pull red x15  Single arm row red x 15 L/R  Hip IR/ER x10  Tball bridge 2x5  Bridge x 5    Neuromuscular Re-Ed:     Therapeutic Activity:   Gait Training:   Manual Therapy:     Modalities:   Dry needling following informed  consent: with e-stim; 4Hz, mA to tolerance, applied to cervical paraspinals, R middle trapezius and lumbar paraspinals and gluteus medius, for 10 minutes.  Pt lying prone.       Assessment:    Pt returns from her 3 week vacation. Pt continues to work on improving her posterior chain strength to reduce her chronic low back pain.     Post-Treatment Symptoms:     Plan:        Goals:

## 2024-05-03 LAB — PTH-INTACT SERPL-MCNC: 80.5 PG/ML (ref 18.5–88)

## 2024-05-06 ENCOUNTER — TREATMENT (OUTPATIENT)
Dept: PHYSICAL THERAPY | Facility: CLINIC | Age: 69
End: 2024-05-06
Payer: MEDICARE

## 2024-05-06 DIAGNOSIS — G89.29 OTHER CHRONIC PAIN: ICD-10-CM

## 2024-05-06 DIAGNOSIS — M54.41 LUMBAGO WITH SCIATICA, RIGHT SIDE: ICD-10-CM

## 2024-05-06 PROCEDURE — 97014 ELECTRIC STIMULATION THERAPY: CPT | Mod: GP

## 2024-05-06 PROCEDURE — 97110 THERAPEUTIC EXERCISES: CPT | Mod: GP

## 2024-05-06 ASSESSMENT — PAIN - FUNCTIONAL ASSESSMENT: PAIN_FUNCTIONAL_ASSESSMENT: 0-10

## 2024-05-06 ASSESSMENT — PAIN SCALES - GENERAL: PAINLEVEL_OUTOF10: 3

## 2024-05-06 NOTE — LETTER
May 6, 2024    Cheryl Fletcher DO  [address] [facility] [address]    Patient: Arabella Hanks   YOB: 1955   Date of Visit: 5/6/2024       Dear Cheryl Fletcher DO  [address] [place] [address]    The attached plan of care is being sent to you because your patient’s medical reimbursement requires that you certify the plan of care. Your signature is required to allow uninterrupted insurance coverage.      You may indicate your approval by signing below and faxing this form back to us at Dept Fax: [phone].    Please call Dept: [phone] with any questions or concerns.    Thank you for this referral,        Paul Montoya PT  [facility] PHYSICIAN [place]ERT[place]  [facility] PHYSICIAN ALBERTO  [address] [facility] [id number] [phone]    Payer: Payor: MEDICAL [facility] MEDICARE / Plan: [facility] MEDICARE / Product Type: *No Product type* /                                                                         Date:     Dear Paul Montoya PT,     Re: Ms. Arabella Hanks, MRN:[id number]    I certify that I have reviewed the attached plan of care and it is medically necessary for Ms. Arabella Hanks (1955) who is under my care.          ______________________________________                    _________________  Provider name and credentials                                           Date and time                                                                                           Plan of Care 2/29/24   Effective from: 2/29/2024  Effective to: 5/31/2024    Plan ID: [id number]            Participants as of Finalize on 5/6/2024    Name Type Comments Contact Info    Cheryl Fletcher DO Primary Care Provider  [phone]    Paul Montoya PT Physical Therapist  [phone]       Last Plan Note     Author: Paul Montoya PT Status: Sign when Signing Visit Last edited: 5/6/2024 12:45 PM       Physical Therapy Treatment/Progress Note    Patient  Name: Arabella Hanks  MRN: [id number]  PT Received On: 05/06/24  Time Calculation  Start Time: 1245  Stop Time: 1340  Time Calculation (min): 55 min  PT Modalities Time Entry  E-Stim (Unattended) Time Entry: 15  PT Therapeutic Procedures Time Entry  Therapeutic Exercise Time Entry: 34  Visit Number: 10   Visits/Dates Authorized: 12    Current Problem:   1. Lumbago with sciatica, right side  Follow Up In Physical Therapy      2. Other chronic pain  Follow Up In Physical Therapy          Surgery:   Surgery date:     Precautions:        Subjective   General:  Pt states her neck is sore today back is feeling a little better. Overall doing better since the start of PT.     Pre-Treatment Symptoms:   Pain Assessment: 0-10  Pain Score: 3Back 3/10  Neck/upper back 5/10    Objective   Findings: Tenderness L lumbar paraspinals    Low Back Disability Questionnaire: 5/6/24: 28% impaired (IE 36% impaired)    Strength: L/R hip ext/abd/lumbar ext 4/5, other LE grossly 5/5    R Shoulder flexion/ER/abduction WFL but painful with active motion  R Shoulder resisted ER/flex 4/5, abd 4-/5-painful    Treatments:      Therapeutic Exercise:   Nustep lv 5 x 5 min  Leg curl 55# 2x15  Shuttle press dbl # x 15, x15, x10  L/R 75# 2x10  KB deadlift onto 9 inch step 18# x 10, 26# x 10  FT row 10# 2x15  FT facepull 2.5# x 10, 5# x 10  Front raise + Tloop shld abd iso pink x 3#  Single arm row L/R 9# 2x10    DNP:  Tball heel slides 2x10  Tball bridge x 10  Bridge x 10  LTR x 10  Prone hip ext 2x12 L/R  Prone back ext 2x8  Open book L/R x 10  Wall slides w/ lift off x 10  Sidelying shoulder abd + ER x10  Quadruped weight shift fwd/back x 10  Sit to stand 2x10  FT shld ext 5# 2x10  Side step Tloop orange 15 feet x 2 L/R   Seated Tball lumbar flexion 2x15  Seated cat/cow  x10  Seated on Tball PPT, circles, 2x10 each  Pallof press red x10 L/R  Push/pull red x15  Single arm row red x 15 L/R  Hip IR/ER x10  Tball bridge 2x5  Bridge x  5    Neuromuscular Re-Ed:     Therapeutic Activity:   Gait Training:   Manual Therapy:     Modalities:   Dry needling following informed consent: with e-stim; 4Hz, mA to tolerance, applied to cervical paraspinals, R middle trapezius and lumbar paraspinals and gluteus medius, for 10 minutes.  Pt lying prone.       Assessment:    Pt has seen improvements with her low back pain as indicated by improved Low Back Disability Questionnaire scores from 36% impaired to 28% impaired this visit. Pt has two more PT visits to help transition to self management.     Post-Treatment Symptoms:     Plan:    Continue two additional PT visits.     Goals:       1. Patient to demonstrate the use of proper body mechanics and posture when performing ADL's and picking up objects > 10 lbs to eliminate/reduce their symptoms for self care independence.-met    2. Patient to sleep uninterrupted for 6 hours without being awakened by their pain to allow them to perform ADL's and work effectively during the day. -progressing    3. Patient to be able to load and unload the  without increased pain in their low back and demonstrate proper body mechanics.-met    4. Decrease patient's subjective low back pain to 3/10.-met    5. Patient will be able to stand > 30 minutes without increased pain to allow patient to prepare meals for self care independence.-progressing    6. Patient to rotate trunk to the left to look over shoulder when driving to safely switch lanes and back up an automobile.-met    7. Patient to be able to load and unload her  without increased pain in their low back while demonstrating proper body mechanics for self care independence.-met    8. Patient will improve their Low Back Disability Score to < 20% to allow patient to carry groceries into home without symptom exacerbation.  -progressing             Current Participants as of 5/6/2024    Name Type Comments Contact Info    Cheryl Fletcher,  Primary Care  Provider  [phone]    Signature pending    Paul Montoya PT Physical Therapist  [phone]    Signature pending

## 2024-05-06 NOTE — LETTER
May 6, 2024    Cheryl Fletcher DO  [address] [facility] [address]    Patient: Arabella Hanks   YOB: 1955   Date of Visit: 5/6/2024       Dear Cheryl Fletcher DO  [address] [place] [address]    The attached plan of care is being sent to you because your patient’s medical reimbursement requires that you certify the plan of care. Your signature is required to allow uninterrupted insurance coverage.      You may indicate your approval by signing below and faxing this form back to us at Dept Fax: [phone].    Please call Dept: [phone] with any questions or concerns.    Thank you for this referral,        Paul Montoya PT  [facility] PHYSICIAN [place]ERT[place]  [facility] PHYSICIAN ALBERTO  [address] [facility] [id number] [phone]    Payer: Payor: MEDICAL [facility] MEDICARE / Plan: [facility] MEDICARE / Product Type: *No Product type* /                                                                         Date:     Dear Paul Montoya PT,     Re: Ms. Arabella Hanks, MRN:[id number]    I certify that I have reviewed the attached plan of care and it is medically necessary for Ms. Arabella Hanks (1955) who is under my care.          ______________________________________                    _________________  Provider name and credentials                                           Date and time                                                                                           Plan of Care 2/29/24   Effective from: 2/29/2024  Effective to: 5/31/2024    Plan ID: [id number]                Participants as of Finalize on 5/6/2024      Name Type Comments Contact Info    Cheryl Fletcher DO Primary Care Provider  [phone]    Paul Montoya PT Physical Therapist  [phone]           Last Plan Note       Author: Paul Montoya PT Status: Sign when Signing Visit Last edited: 5/6/2024 12:45 PM         Physical Therapy Treatment/Progress  Note    Patient Name: Arabella Hanks  MRN: [id number]  PT Received On: 05/06/24  Time Calculation  Start Time: 1245  Stop Time: 1340  Time Calculation (min): 55 min  PT Modalities Time Entry  E-Stim (Unattended) Time Entry: 15  PT Therapeutic Procedures Time Entry  Therapeutic Exercise Time Entry: 34  Visit Number: 10   Visits/Dates Authorized: 12    Current Problem:   1. Lumbago with sciatica, right side  Follow Up In Physical Therapy      2. Other chronic pain  Follow Up In Physical Therapy          Surgery:   Surgery date:     Precautions:        Subjective   General:  Pt states her neck is sore today back is feeling a little better. Overall doing better since the start of PT.     Pre-Treatment Symptoms:   Pain Assessment: 0-10  Pain Score: 3Back 3/10  Neck/upper back 5/10    Objective   Findings: Tenderness L lumbar paraspinals    Low Back Disability Questionnaire: 5/6/24: 28% impaired (IE 36% impaired)    Strength: L/R hip ext/abd/lumbar ext 4/5, other LE grossly 5/5    R Shoulder flexion/ER/abduction WFL but painful with active motion  R Shoulder resisted ER/flex 4/5, abd 4-/5-painful    Treatments:      Therapeutic Exercise:   Nustep lv 5 x 5 min  Leg curl 55# 2x15  Shuttle press dbl # x 15, x15, x10  L/R 75# 2x10  KB deadlift onto 9 inch step 18# x 10, 26# x 10  FT row 10# 2x15  FT facepull 2.5# x 10, 5# x 10  Front raise + Tloop shld abd iso pink x 3#  Single arm row L/R 9# 2x10    DNP:  Tball heel slides 2x10  Tball bridge x 10  Bridge x 10  LTR x 10  Prone hip ext 2x12 L/R  Prone back ext 2x8  Open book L/R x 10  Wall slides w/ lift off x 10  Sidelying shoulder abd + ER x10  Quadruped weight shift fwd/back x 10  Sit to stand 2x10  FT shld ext 5# 2x10  Side step Tloop orange 15 feet x 2 L/R   Seated Tball lumbar flexion 2x15  Seated cat/cow  x10  Seated on Tball PPT, circles, 2x10 each  Pallof press red x10 L/R  Push/pull red x15  Single arm row red x 15 L/R  Hip IR/ER x10  Tball bridge 2x5  Bridge  x 5    Neuromuscular Re-Ed:     Therapeutic Activity:   Gait Training:   Manual Therapy:     Modalities:   Dry needling following informed consent: with e-stim; 4Hz, mA to tolerance, applied to cervical paraspinals, R middle trapezius and lumbar paraspinals and gluteus medius, for 10 minutes.  Pt lying prone.       Assessment:    Pt has seen improvements with her low back pain as indicated by improved Low Back Disability Questionnaire scores from 36% impaired to 28% impaired this visit. Pt has two more PT visits to help transition to self management.     Post-Treatment Symptoms:     Plan:    Continue two additional PT visits.     Goals:       1. Patient to demonstrate the use of proper body mechanics and posture when performing ADL's and picking up objects > 10 lbs to eliminate/reduce their symptoms for self care independence.-met    2. Patient to sleep uninterrupted for 6 hours without being awakened by their pain to allow them to perform ADL's and work effectively during the day. -progressing    3. Patient to be able to load and unload the  without increased pain in their low back and demonstrate proper body mechanics.-met    4. Decrease patient's subjective low back pain to 3/10.-met    5. Patient will be able to stand > 30 minutes without increased pain to allow patient to prepare meals for self care independence.-progressing    6. Patient to rotate trunk to the left to look over shoulder when driving to safely switch lanes and back up an automobile.-met    7. Patient to be able to load and unload her  without increased pain in their low back while demonstrating proper body mechanics for self care independence.-met    8. Patient will improve their Low Back Disability Score to < 20% to allow patient to carry groceries into home without symptom exacerbation.  -progressing             Current Participants as of 5/6/2024      Name Type Comments Contact Info    Cheryl Fletcher,  Primary Care  Provider  [phone]    Signature pending    Paul Montoya, DEJUAN Physical Therapist  [phone]    Electronically signed by Paul Montoya PT at 5/6/2024 1432 EDT

## 2024-05-06 NOTE — PROGRESS NOTES
Physical Therapy Treatment/Progress Note    Patient Name: Arabella Hanks  MRN: [id number]  PT Received On: 05/06/24  Time Calculation  Start Time: 1245  Stop Time: 1340  Time Calculation (min): 55 min  PT Modalities Time Entry  E-Stim (Unattended) Time Entry: 15  PT Therapeutic Procedures Time Entry  Therapeutic Exercise Time Entry: 34  Visit Number: 10   Visits/Dates Authorized: 12    Current Problem:   1. Lumbago with sciatica, right side  Follow Up In Physical Therapy      2. Other chronic pain  Follow Up In Physical Therapy          Surgery:   Surgery date:     Precautions:        Subjective   General:  Pt states her neck is sore today back is feeling a little better. Overall doing better since the start of PT.     Pre-Treatment Symptoms:   Pain Assessment: 0-10  Pain Score: 3Back 3/10  Neck/upper back 5/10    Objective   Findings: Tenderness L lumbar paraspinals    Low Back Disability Questionnaire: 5/6/24: 28% impaired (IE 36% impaired)    Strength: L/R hip ext/abd/lumbar ext 4/5, other LE grossly 5/5    R Shoulder flexion/ER/abduction WFL but painful with active motion  R Shoulder resisted ER/flex 4/5, abd 4-/5-painful    Treatments:      Therapeutic Exercise:   Nustep lv 5 x 5 min  Leg curl 55# 2x15  Shuttle press dbl # x 15, x15, x10  L/R 75# 2x10  KB deadlift onto 9 inch step 18# x 10, 26# x 10  FT row 10# 2x15  FT facepull 2.5# x 10, 5# x 10  Front raise + Tloop shld abd iso pink x 3#  Single arm row L/R 9# 2x10    DNP:  Tball heel slides 2x10  Tball bridge x 10  Bridge x 10  LTR x 10  Prone hip ext 2x12 L/R  Prone back ext 2x8  Open book L/R x 10  Wall slides w/ lift off x 10  Sidelying shoulder abd + ER x10  Quadruped weight shift fwd/back x 10  Sit to stand 2x10  FT shld ext 5# 2x10  Side step Tloop orange 15 feet x 2 L/R   Seated Tball lumbar flexion 2x15  Seated cat/cow  x10  Seated on Tball PPT, circles, 2x10 each  Pallof press red x10 L/R  Push/pull red x15  Single arm row red x 15 L/R  Hip  IR/ER x10  Tball bridge 2x5  Bridge x 5    Neuromuscular Re-Ed:     Therapeutic Activity:   Gait Training:   Manual Therapy:     Modalities:   Dry needling following informed consent: with e-stim; 4Hz, mA to tolerance, applied to cervical paraspinals, R middle trapezius and lumbar paraspinals and gluteus medius, for 10 minutes.  Pt lying prone.       Assessment:    Pt has seen improvements with her low back pain as indicated by improved Low Back Disability Questionnaire scores from 36% impaired to 28% impaired this visit. Pt has two more PT visits to help transition to self management.     Post-Treatment Symptoms:     Plan:    Continue two additional PT visits.     Goals:       1. Patient to demonstrate the use of proper body mechanics and posture when performing ADL's and picking up objects > 10 lbs to eliminate/reduce their symptoms for self care independence.-met    2. Patient to sleep uninterrupted for 6 hours without being awakened by their pain to allow them to perform ADL's and work effectively during the day. -progressing    3. Patient to be able to load and unload the  without increased pain in their low back and demonstrate proper body mechanics.-met    4. Decrease patient's subjective low back pain to 3/10.-met    5. Patient will be able to stand > 30 minutes without increased pain to allow patient to prepare meals for self care independence.-progressing    6. Patient to rotate trunk to the left to look over shoulder when driving to safely switch lanes and back up an automobile.-met    7. Patient to be able to load and unload her  without increased pain in their low back while demonstrating proper body mechanics for self care independence.-met    8. Patient will improve their Low Back Disability Score to < 20% to allow patient to carry groceries into home without symptom exacerbation.  -progressing

## 2024-05-13 ENCOUNTER — TREATMENT (OUTPATIENT)
Dept: PHYSICAL THERAPY | Facility: CLINIC | Age: 69
End: 2024-05-13
Payer: MEDICARE

## 2024-05-13 DIAGNOSIS — G89.29 OTHER CHRONIC PAIN: ICD-10-CM

## 2024-05-13 DIAGNOSIS — M54.41 LUMBAGO WITH SCIATICA, RIGHT SIDE: ICD-10-CM

## 2024-05-13 PROCEDURE — 97110 THERAPEUTIC EXERCISES: CPT | Mod: GP

## 2024-05-13 PROCEDURE — 97140 MANUAL THERAPY 1/> REGIONS: CPT | Mod: GP

## 2024-05-13 ASSESSMENT — PAIN - FUNCTIONAL ASSESSMENT: PAIN_FUNCTIONAL_ASSESSMENT: 0-10

## 2024-05-13 ASSESSMENT — PAIN SCALES - GENERAL: PAINLEVEL_OUTOF10: 6

## 2024-05-13 NOTE — PROGRESS NOTES
Physical Therapy Treatment    Patient Name: Arabella Hanks  MRN: [id number]  PT Received On: 05/13/24  Time Calculation  Start Time: 1300  Stop Time: 1347  Time Calculation (min): 47 min  PT Therapeutic Procedures Time Entry  Manual Therapy Time Entry: 5  Therapeutic Exercise Time Entry: 34  Visit Number: 11  Visits/Dates Authorized: 12    Current Problem:   1. Lumbago with sciatica, right side  Follow Up In Physical Therapy      2. Other chronic pain  Follow Up In Physical Therapy          Precautions: L/R TKR       Subjective   General:  Pt states her back is sore from yard work over the weekend.     Pre-Treatment Symptoms:   Pain Assessment: 0-10  Pain Score: 6    Objective   Findings: Tenderness L lumbar paraspinals    Low Back Disability Questionnaire: 5/6/24: 28% impaired (IE 36% impaired)    Strength: L/R hip ext/abd/lumbar ext 4/5, other LE grossly 5/5    R Shoulder flexion/ER/abduction WFL but painful with active motion  R Shoulder resisted ER/flex 4/5, abd 4-/5-painful    Treatments:      Therapeutic Exercise:   Nustep lv 5 x 5 min  Over the counter hip ext L/R x 12  Rev hyper x 5  Leg curl 60# 2x10  Shuttle press dbl # x 15, x15, x10  L/R 75# 2x10  KB deadlift onto 9 inch step 18# 2x 10  FT row 10# 2x15  FT chops 15# x 10 L/R  Tloop side step orange 15 feet L/R x 6      DNP:  FT facepull 2.5# x 10, 5# x 10  Front raise + Tloop shld abd iso pink x 3#  Single arm row L/R 9# 2x10  Tball heel slides 2x10  Tball bridge x 10  Bridge x 10  LTR x 10  Prone hip ext 2x12 L/R  Prone back ext 2x8  Open book L/R x 10  Wall slides w/ lift off x 10  Sidelying shoulder abd + ER x10  Quadruped weight shift fwd/back x 10  Sit to stand 2x10  FT shld ext 5# 2x10  Side step Tloop orange 15 feet x 2 L/R   Seated Tball lumbar flexion 2x15  Seated cat/cow  x10  Seated on Tball PPT, circles, 2x10 each  Pallof press red x10 L/R  Push/pull red x15  Single arm row red x 15 L/R  Hip IR/ER x10  Tball bridge 2x5  Bridge x  5  Neuromuscular Re-Ed:     Therapeutic Activity:   Gait Training:   Manual Therapy:   Effleurage to L/R thoracic and lumbar paraspinals and thoracolumbar fascia. Manual trigger point release L/R gluteus medius/quadratus lumborum.    Modalities:   DNP:  Dry needling following informed consent: with e-stim; 4Hz, mA to tolerance, applied to cervical paraspinals, R middle trapezius and lumbar paraspinals and gluteus medius, for 10 minutes.  Pt lying prone.       Assessment:    Pt reporting pain relief after manual therapy. Pt continues to work on hip/low back strength to reduce pain with functional activities such as yard work/lifting grandchildren.    Post-Treatment Symptoms:     Plan:    Continue two additional PT visits.     Goals:       1. Patient to demonstrate the use of proper body mechanics and posture when performing ADL's and picking up objects > 10 lbs to eliminate/reduce their symptoms for self care independence.-met    2. Patient to sleep uninterrupted for 6 hours without being awakened by their pain to allow them to perform ADL's and work effectively during the day. -progressing    3. Patient to be able to load and unload the  without increased pain in their low back and demonstrate proper body mechanics.-met    4. Decrease patient's subjective low back pain to 3/10.-met    5. Patient will be able to stand > 30 minutes without increased pain to allow patient to prepare meals for self care independence.-progressing    6. Patient to rotate trunk to the left to look over shoulder when driving to safely switch lanes and back up an automobile.-met    7. Patient to be able to load and unload her  without increased pain in their low back while demonstrating proper body mechanics for self care independence.-met    8. Patient will improve their Low Back Disability Score to < 20% to allow patient to carry groceries into home without symptom exacerbation.  -progressing

## 2024-05-20 ENCOUNTER — APPOINTMENT (OUTPATIENT)
Dept: PHYSICAL THERAPY | Facility: CLINIC | Age: 69
End: 2024-05-20
Payer: MEDICARE

## 2024-06-06 ENCOUNTER — APPOINTMENT (OUTPATIENT)
Dept: PHYSICAL THERAPY | Facility: CLINIC | Age: 69
End: 2024-06-06
Payer: MEDICARE

## 2024-06-13 ENCOUNTER — TREATMENT (OUTPATIENT)
Dept: PHYSICAL THERAPY | Facility: CLINIC | Age: 69
End: 2024-06-13
Payer: MEDICARE

## 2024-06-13 DIAGNOSIS — M54.41 LUMBAGO WITH SCIATICA, RIGHT SIDE: ICD-10-CM

## 2024-06-13 DIAGNOSIS — G89.29 OTHER CHRONIC PAIN: ICD-10-CM

## 2024-06-13 PROCEDURE — 97110 THERAPEUTIC EXERCISES: CPT | Mod: GP

## 2024-06-13 PROCEDURE — 97140 MANUAL THERAPY 1/> REGIONS: CPT | Mod: GP

## 2024-06-13 PROCEDURE — 97014 ELECTRIC STIMULATION THERAPY: CPT | Mod: GP

## 2024-06-13 ASSESSMENT — PAIN - FUNCTIONAL ASSESSMENT: PAIN_FUNCTIONAL_ASSESSMENT: 0-10

## 2024-06-13 ASSESSMENT — PAIN SCALES - GENERAL: PAINLEVEL_OUTOF10: 9

## 2024-06-13 NOTE — LETTER
June 13, 2024    Cheryl Fletcher DO  [address] [facility] [address]    Patient: Arabella Hanks   YOB: 1955   Date of Visit: 6/13/2024       Dear Cheryl Fletcher DO  [address] [place] [address]    The attached plan of care is being sent to you because your patient’s medical reimbursement requires that you certify the plan of care. Your signature is required to allow uninterrupted insurance coverage.      You may indicate your approval by signing below and faxing this form back to us at Dept Fax: [phone].    Please call Dept: [phone] with any questions or concerns.    Thank you for this referral,        Paul Montoya PT  [facility] PHYSICIAN [place]  [facility] PHYSICIAN [place]O  [address] [facility] [id number] [phone]    Payer: Payor: MEDICAL [facility] MEDICARE / Plan: [facility] MEDICARE / Product Type: *No Product type* /                                                                         Date:     Dear Paul Montoya PT,     Re: Ms. Arabella Hanks, MRN:[id number]    I certify that I have reviewed the attached plan of care and it is medically necessary for Ms. Arabella Hanks (1955) who is under my care.          ______________________________________                    _________________  Provider name and credentials                                           Date and time                                                                                           Plan of Care 6/13/24   Effective from: 6/13/2024  Effective to: 8/12/2024    Plan ID: [id number]            Participants as of Finalize on 6/13/2024    Name Type Comments Contact Info    Cheryl Fletcher DO Primary Care Provider  [phone]    Paul Montoya PT Physical Therapist  [phone]       Last Plan Note     Author: Paul Montoya PT Status: Sign when Signing Visit Last edited: 6/13/2024  2:15 PM       Physical Therapy Treatment/Progress  "Note    Patient Name: Arabella Hanks  MRN: [id number]  PT Received On: 06/13/24  Time Calculation  Start Time: 1415  Stop Time: 1505  Time Calculation (min): 50 min  PT Modalities Time Entry  E-Stim (Unattended) Time Entry: 15  PT Therapeutic Procedures Time Entry  Manual Therapy Time Entry: 10  Therapeutic Exercise Time Entry: 24  Visit Number: 12  Visits/Dates Authorized: 12    Current Problem:   1. Lumbago with sciatica, right side  Follow Up In Physical Therapy      2. Other chronic pain  Follow Up In Physical Therapy          Precautions: L/R TKR       Subjective   General:  Pt returns after one month since her last PT session. Pt states she had a flare up of her back/neck and shoulder pain. Pt recently went on a trip to an indoor water park and was lifting her grandchildren a bunch and has had pain since.     Pre-Treatment Symptoms:   Pain Assessment: 0-10  Pain Score: 9    Objective   Findings: Tenderness R/L lumbar paraspinals, L/R quadratus lumborum tenderness    Midthoracic paraspinal hypertonicity at T5-T8 L/R    Low Back Disability Questionnaire: 5/6/24: 28% impaired (IE 36% impaired)    Strength: L/R hip ext/abd/lumbar ext 4/5, other LE grossly 5/5    R Shoulder flexion/ER/abduction WFL but painful with active motion  R Shoulder resisted ER/flex 4/5, abd 4-/5-painful    C/o pain while rolling L/R in low back, and transferring supine to sit    Treatments:      Therapeutic Exercise:   Pulleys x 2 min  LTR x 10  Tball heel slides 2x10  Open book L/R x 10  Wall slides w/ lift off x 10  Seated Tball lumbar flexion 2x15  Dbl ER iso pink 5x10\" hold  SKTC x5 L/R  Hooklying hip abd light iso 30\" x 5    DNP:  Over the counter hip ext L/R x 12  Rev hyper x 5  Leg curl 60# 2x10  Shuttle press dbl # x 15, x15, x10  L/R 75# 2x10  KB deadlift onto 9 inch step 18# 2x 10  FT row 10# 2x15  FT chops 15# x 10 L/R  Tloop side step orange 15 feet L/R x 6  FT facepull 2.5# x 10, 5# x 10  Front raise + Tloop shld abd " iso pink x 3#  Single arm row L/R 9# 2x10  Tball bridge x 10  Bridge x 10  Prone hip ext 2x12 L/R  Prone back ext 2x8  Sidelying shoulder abd + ER x10  Quadruped weight shift fwd/back x 10  Sit to stand 2x10  FT shld ext 5# 2x10  Side step Tloop orange 15 feet x 2 L/R   Seated cat/cow  x10  Seated on Tball PPT, circles, 2x10 each  Pallof press red x10 L/R  Push/pull red x15  Single arm row red x 15 L/R  Hip IR/ER x10  Tball bridge 2x5  Bridge x 5    Neuromuscular Re-Ed:   Therapeutic Activity:   Gait Training:     Manual Therapy:   Effleurage to L/R thoracic and lumbar paraspinals and thoracolumbar fascia. Manual trigger point release L/R gluteus medius/quadratus lumborum.    MET for cervical SB L/R, suboccpitals    Modalities:     Dry needling following informed consent: with e-stim; 4Hz, mA to tolerance, applied to midthoracic and lumbar paraspinals and gluteus medius, for 10 minutes.  Pt lying prone.       Assessment:    Pt having increased pain after her recent trip to an indoor water park with her grandkids. Pt c/o neck/L shoulder and back pain today. Pt states her back pain was 9/10. Pt states she was doing a lot of heavy lifting and carrying her grand kids. Pt would benefit from 6 additional PT visits to address her increased low back pain.     Post-Treatment Symptoms:     Plan:    Continue PT for 6 additional PT visits.     Goals:       1. Patient to demonstrate the use of proper body mechanics and posture when performing ADL's and picking up objects > 10 lbs to eliminate/reduce their symptoms for self care independence.-met    2. Patient to sleep uninterrupted for 6 hours without being awakened by their pain to allow them to perform ADL's and work effectively during the day. -progressing    3. Patient to be able to load and unload the  without increased pain in their low back and demonstrate proper body mechanics.-met    4. Decrease patient's subjective low back pain to 3/10.-met    5. Patient  will be able to stand > 30 minutes without increased pain to allow patient to prepare meals for self care independence.-progressing    6. Patient to rotate trunk to the left to look over shoulder when driving to safely switch lanes and back up an automobile.-met    7. Patient to be able to load and unload her  without increased pain in their low back while demonstrating proper body mechanics for self care independence.-met    8. Patient will improve their Low Back Disability Score to < 20% to allow patient to carry groceries into home without symptom exacerbation.  -progressing             Current Participants as of 6/13/2024    Name Type Comments Contact Info    Cheryl Fletcher DO Primary Care Provider  [phone]    Signature pending    Paul Montoya PT Physical Therapist  [phone]    Signature pending

## 2024-06-13 NOTE — LETTER
June 13, 2024    Cheryl Fletcher DO  [address] [facility] [address]    Patient: Arabella Hanks   YOB: 1955   Date of Visit: 6/13/2024       Dear Cheryl Fletcher DO  [address] [place] [address]    The attached plan of care is being sent to you because your patient’s medical reimbursement requires that you certify the plan of care. Your signature is required to allow uninterrupted insurance coverage.      You may indicate your approval by signing below and faxing this form back to us at Dept Fax: [phone].    Please call Dept: [phone] with any questions or concerns.    Thank you for this referral,        Paul Montoya PT  [facility] PHYSICIAN [place]  [facility] PHYSICIAN [place]O  [address] [facility] [id number] [phone]    Payer: Payor: MEDICAL [facility] MEDICARE / Plan: [facility] MEDICARE / Product Type: *No Product type* /                                                                         Date:     Dear Paul Montoya PT,     Re: Ms. Arabella Hanks, MRN:[id number]    I certify that I have reviewed the attached plan of care and it is medically necessary for Ms. Arabella Hanks (1955) who is under my care.          ______________________________________                    _________________  Provider name and credentials                                           Date and time                                                                                           Plan of Care 6/13/24   Effective from: 6/13/2024  Effective to: 8/12/2024    Plan ID: [id number]                Participants as of Finalize on 6/13/2024      Name Type Comments Contact Info    Cheryl Fletcher DO Primary Care Provider  [phone]    Paul Montoya PT Physical Therapist  [phone]           Last Plan Note       Author: Paul Montoya PT Status: Sign when Signing Visit Last edited: 6/13/2024  2:15 PM         Physical Therapy Treatment/Progress  "Note    Patient Name: Arabella Hanks  MRN: [id number]  PT Received On: 06/13/24  Time Calculation  Start Time: 1415  Stop Time: 1505  Time Calculation (min): 50 min  PT Modalities Time Entry  E-Stim (Unattended) Time Entry: 15  PT Therapeutic Procedures Time Entry  Manual Therapy Time Entry: 10  Therapeutic Exercise Time Entry: 24  Visit Number: 12  Visits/Dates Authorized: 12    Current Problem:   1. Lumbago with sciatica, right side  Follow Up In Physical Therapy      2. Other chronic pain  Follow Up In Physical Therapy          Precautions: L/R TKR       Subjective   General:  Pt returns after one month since her last PT session. Pt states she had a flare up of her back/neck and shoulder pain. Pt recently went on a trip to an indoor water park and was lifting her grandchildren a bunch and has had pain since.     Pre-Treatment Symptoms:   Pain Assessment: 0-10  Pain Score: 9    Objective   Findings: Tenderness R/L lumbar paraspinals, L/R quadratus lumborum tenderness    Midthoracic paraspinal hypertonicity at T5-T8 L/R    Low Back Disability Questionnaire: 5/6/24: 28% impaired (IE 36% impaired)    Strength: L/R hip ext/abd/lumbar ext 4/5, other LE grossly 5/5    R Shoulder flexion/ER/abduction WFL but painful with active motion  R Shoulder resisted ER/flex 4/5, abd 4-/5-painful    C/o pain while rolling L/R in low back, and transferring supine to sit    Treatments:      Therapeutic Exercise:   Pulleys x 2 min  LTR x 10  Tball heel slides 2x10  Open book L/R x 10  Wall slides w/ lift off x 10  Seated Tball lumbar flexion 2x15  Dbl ER iso pink 5x10\" hold  SKTC x5 L/R  Hooklying hip abd light iso 30\" x 5    DNP:  Over the counter hip ext L/R x 12  Rev hyper x 5  Leg curl 60# 2x10  Shuttle press dbl # x 15, x15, x10  L/R 75# 2x10  KB deadlift onto 9 inch step 18# 2x 10  FT row 10# 2x15  FT chops 15# x 10 L/R  Tloop side step orange 15 feet L/R x 6  FT facepull 2.5# x 10, 5# x 10  Front raise + Tloop shld abd " iso pink x 3#  Single arm row L/R 9# 2x10  Tball bridge x 10  Bridge x 10  Prone hip ext 2x12 L/R  Prone back ext 2x8  Sidelying shoulder abd + ER x10  Quadruped weight shift fwd/back x 10  Sit to stand 2x10  FT shld ext 5# 2x10  Side step Tloop orange 15 feet x 2 L/R   Seated cat/cow  x10  Seated on Tball PPT, circles, 2x10 each  Pallof press red x10 L/R  Push/pull red x15  Single arm row red x 15 L/R  Hip IR/ER x10  Tball bridge 2x5  Bridge x 5    Neuromuscular Re-Ed:   Therapeutic Activity:   Gait Training:     Manual Therapy:   Effleurage to L/R thoracic and lumbar paraspinals and thoracolumbar fascia. Manual trigger point release L/R gluteus medius/quadratus lumborum.    MET for cervical SB L/R, suboccpitals    Modalities:     Dry needling following informed consent: with e-stim; 4Hz, mA to tolerance, applied to midthoracic and lumbar paraspinals and gluteus medius, for 10 minutes.  Pt lying prone.       Assessment:    Pt having increased pain after her recent trip to an indoor water park with her grandkids. Pt c/o neck/L shoulder and back pain today. Pt states her back pain was 9/10. Pt states she was doing a lot of heavy lifting and carrying her grand kids. Pt would benefit from 6 additional PT visits to address her increased low back pain.     Post-Treatment Symptoms:     Plan:    Continue PT for 6 additional PT visits.     Goals:       1. Patient to demonstrate the use of proper body mechanics and posture when performing ADL's and picking up objects > 10 lbs to eliminate/reduce their symptoms for self care independence.-met    2. Patient to sleep uninterrupted for 6 hours without being awakened by their pain to allow them to perform ADL's and work effectively during the day. -progressing    3. Patient to be able to load and unload the  without increased pain in their low back and demonstrate proper body mechanics.-met    4. Decrease patient's subjective low back pain to 3/10.-met    5. Patient  will be able to stand > 30 minutes without increased pain to allow patient to prepare meals for self care independence.-progressing    6. Patient to rotate trunk to the left to look over shoulder when driving to safely switch lanes and back up an automobile.-met    7. Patient to be able to load and unload her  without increased pain in their low back while demonstrating proper body mechanics for self care independence.-met    8. Patient will improve their Low Back Disability Score to < 20% to allow patient to carry groceries into home without symptom exacerbation.  -progressing             Current Participants as of 6/13/2024      Name Type Comments Contact Info    Cheryl Fletcher DO Primary Care Provider  [phone]    Signature pending    Paul Montoya PT Physical Therapist  [phone]    Signature pending

## 2024-06-13 NOTE — PROGRESS NOTES
"Physical Therapy Treatment/Progress Note    Patient Name: Arabella Hanks  MRN: [id number]  PT Received On: 06/13/24  Time Calculation  Start Time: 1415  Stop Time: 1505  Time Calculation (min): 50 min  PT Modalities Time Entry  E-Stim (Unattended) Time Entry: 15  PT Therapeutic Procedures Time Entry  Manual Therapy Time Entry: 10  Therapeutic Exercise Time Entry: 24  Visit Number: 12  Visits/Dates Authorized: 12    Current Problem:   1. Lumbago with sciatica, right side  Follow Up In Physical Therapy      2. Other chronic pain  Follow Up In Physical Therapy          Precautions: L/R TKR       Subjective   General:  Pt returns after one month since her last PT session. Pt states she had a flare up of her back/neck and shoulder pain. Pt recently went on a trip to an indoor water park and was lifting her grandchildren a bunch and has had pain since.     Pre-Treatment Symptoms:   Pain Assessment: 0-10  Pain Score: 9    Objective   Findings: Tenderness R/L lumbar paraspinals, L/R quadratus lumborum tenderness    Midthoracic paraspinal hypertonicity at T5-T8 L/R    Low Back Disability Questionnaire: 5/6/24: 28% impaired (IE 36% impaired)    Strength: L/R hip ext/abd/lumbar ext 4/5, other LE grossly 5/5    R Shoulder flexion/ER/abduction WFL but painful with active motion  R Shoulder resisted ER/flex 4/5, abd 4-/5-painful    C/o pain while rolling L/R in low back, and transferring supine to sit    Treatments:      Therapeutic Exercise:   Pulleys x 2 min  LTR x 10  Tball heel slides 2x10  Open book L/R x 10  Wall slides w/ lift off x 10  Seated Tball lumbar flexion 2x15  Dbl ER iso pink 5x10\" hold  SKTC x5 L/R  Hooklying hip abd light iso 30\" x 5    DNP:  Over the counter hip ext L/R x 12  Rev hyper x 5  Leg curl 60# 2x10  Shuttle press dbl # x 15, x15, x10  L/R 75# 2x10  KB deadlift onto 9 inch step 18# 2x 10  FT row 10# 2x15  FT chops 15# x 10 L/R  Tloop side step orange 15 feet L/R x 6  FT facepull 2.5# x 10, 5# " x 10  Front raise + Tloop shld abd iso pink x 3#  Single arm row L/R 9# 2x10  Tball bridge x 10  Bridge x 10  Prone hip ext 2x12 L/R  Prone back ext 2x8  Sidelying shoulder abd + ER x10  Quadruped weight shift fwd/back x 10  Sit to stand 2x10  FT shld ext 5# 2x10  Side step Tloop orange 15 feet x 2 L/R   Seated cat/cow  x10  Seated on Tball PPT, circles, 2x10 each  Pallof press red x10 L/R  Push/pull red x15  Single arm row red x 15 L/R  Hip IR/ER x10  Tball bridge 2x5  Bridge x 5    Neuromuscular Re-Ed:   Therapeutic Activity:   Gait Training:     Manual Therapy:   Effleurage to L/R thoracic and lumbar paraspinals and thoracolumbar fascia. Manual trigger point release L/R gluteus medius/quadratus lumborum.    MET for cervical SB L/R, suboccpitals    Modalities:     Dry needling following informed consent: with e-stim; 4Hz, mA to tolerance, applied to midthoracic and lumbar paraspinals and gluteus medius, for 10 minutes.  Pt lying prone.       Assessment:    Pt having increased pain after her recent trip to an indoor water park with her grandkids. Pt c/o neck/L shoulder and back pain today. Pt states her back pain was 9/10. Pt states she was doing a lot of heavy lifting and carrying her grand kids. Pt would benefit from 6 additional PT visits to address her increased low back pain.     Post-Treatment Symptoms:     Plan:    Continue PT for 6 additional PT visits.     Goals:       1. Patient to demonstrate the use of proper body mechanics and posture when performing ADL's and picking up objects > 10 lbs to eliminate/reduce their symptoms for self care independence.-met    2. Patient to sleep uninterrupted for 6 hours without being awakened by their pain to allow them to perform ADL's and work effectively during the day. -progressing    3. Patient to be able to load and unload the  without increased pain in their low back and demonstrate proper body mechanics.-met    4. Decrease patient's subjective low back  pain to 3/10.-met    5. Patient will be able to stand > 30 minutes without increased pain to allow patient to prepare meals for self care independence.-progressing    6. Patient to rotate trunk to the left to look over shoulder when driving to safely switch lanes and back up an automobile.-met    7. Patient to be able to load and unload her  without increased pain in their low back while demonstrating proper body mechanics for self care independence.-met    8. Patient will improve their Low Back Disability Score to < 20% to allow patient to carry groceries into home without symptom exacerbation.  -progressing

## 2024-07-11 ENCOUNTER — TREATMENT (OUTPATIENT)
Dept: PHYSICAL THERAPY | Facility: CLINIC | Age: 69
End: 2024-07-11
Payer: MEDICARE

## 2024-07-11 DIAGNOSIS — G89.29 OTHER CHRONIC PAIN: ICD-10-CM

## 2024-07-11 DIAGNOSIS — M54.41 LUMBAGO WITH SCIATICA, RIGHT SIDE: ICD-10-CM

## 2024-07-11 PROCEDURE — 97014 ELECTRIC STIMULATION THERAPY: CPT | Mod: GP

## 2024-07-11 PROCEDURE — 97110 THERAPEUTIC EXERCISES: CPT | Mod: GP

## 2024-07-11 PROCEDURE — 97140 MANUAL THERAPY 1/> REGIONS: CPT | Mod: GP

## 2024-07-11 ASSESSMENT — PAIN SCALES - GENERAL: PAINLEVEL_OUTOF10: 7

## 2024-07-11 ASSESSMENT — PAIN - FUNCTIONAL ASSESSMENT: PAIN_FUNCTIONAL_ASSESSMENT: 0-10

## 2024-07-11 NOTE — PROGRESS NOTES
"Physical Therapy Treatment  Patient Name: Arabella Hanks  MRN: [id number]  PT Received On: 07/11/24  Time Calculation  Start Time: 1415  Stop Time: 1500  Time Calculation (min): 45 min  PT Modalities Time Entry  E-Stim (Unattended) Time Entry: 15  PT Therapeutic Procedures Time Entry  Manual Therapy Time Entry: 15  Therapeutic Exercise Time Entry: 14  Visit Number: 13/18  Visits/Dates Authorized: [place]    Current Problem:   1. Lumbago with sciatica, right side  Follow Up In Physical Therapy      2. Other chronic pain  Follow Up In Physical Therapy          Precautions: L/R TKR       Subjective   General:  Pt reports her back is sore as well as her R shoulder. Interested in seeing an orthopedic for her lingering R shoulder pain.     Pre-Treatment Symptoms:   Pain Assessment: 0-10  0-10 (Numeric) Pain Score: 7    Objective   Findings: Tenderness R/L lumbar paraspinals, L/R quadratus lumborum tenderness    Midthoracic paraspinal hypertonicity at T5-T8 L/R    Low Back Disability Questionnaire: 5/6/24: 28% impaired (IE 36% impaired)    Strength: L/R hip ext/abd/lumbar ext 4/5, other LE grossly 5/5    R Shoulder flexion/ER/abduction WFL but painful with active motion  R Shoulder resisted ER/flex 4/5, abd 4-/5-painful    C/o pain while rolling L/R in low back, and transferring supine to sit    Treatments:      Therapeutic Exercise:   PPT x 15  LTR x 10  Tball heel slides 2x10  Open book L/R x 10  Seated Tball lumbar flexion 2x15  Hooklying hip abd light iso 30\" x 5    DNP:  Over the counter hip ext L/R x 12  Rev hyper x 5  Leg curl 60# 2x10  Shuttle press dbl # x 15, x15, x10  L/R 75# 2x10  KB deadlift onto 9 inch step 18# 2x 10  FT row 10# 2x15  FT chops 15# x 10 L/R  Tloop side step orange 15 feet L/R x 6  FT facepull 2.5# x 10, 5# x 10  Front raise + Tloop shld abd iso pink x 3#  Single arm row L/R 9# 2x10  Tball bridge x 10  Bridge x 10  Prone hip ext 2x12 L/R  Prone back ext 2x8  Sidelying shoulder abd + ER " x10  Quadruped weight shift fwd/back x 10  Sit to stand 2x10  FT shld ext 5# 2x10  Side step Tloop orange 15 feet x 2 L/R   Seated cat/cow  x10  Seated on Tball PPT, circles, 2x10 each  Pallof press red x10 L/R  Push/pull red x15  Single arm row red x 15 L/R  Hip IR/ER x10  Tball bridge 2x5  Bridge x 5    Neuromuscular Re-Ed:   Therapeutic Activity:   Gait Training:     Manual Therapy:   Effleurage to L/R thoracic and lumbar paraspinals and thoracolumbar fascia. Manual trigger point release L/R gluteus medius/quadratus lumborum.      Modalities:     Dry needling following informed consent: with e-stim; 100Hz, mA to tolerance, applied to midthoracic and lumbar paraspinals and gluteus medius, for 10 minutes.  Pt lying prone. For pain relief.        Assessment:    Pt still c/o lingering R shoulder pain and low back pain. Pt requested hand/shoulder orthopedic specialist and information to patient was provided. Pt continues to work on lumbar ROM and manual therapy for pain relief.     Post-Treatment Symptoms:     Plan:    Continue PT for 6 additional PT visits.     Goals:       1. Patient to demonstrate the use of proper body mechanics and posture when performing ADL's and picking up objects > 10 lbs to eliminate/reduce their symptoms for self care independence.-met    2. Patient to sleep uninterrupted for 6 hours without being awakened by their pain to allow them to perform ADL's and work effectively during the day. -progressing    3. Patient to be able to load and unload the  without increased pain in their low back and demonstrate proper body mechanics.-met    4. Decrease patient's subjective low back pain to 3/10.-met    5. Patient will be able to stand > 30 minutes without increased pain to allow patient to prepare meals for self care independence.-progressing    6. Patient to rotate trunk to the left to look over shoulder when driving to safely switch lanes and back up an automobile.-met    7. Patient to  be able to load and unload her  without increased pain in their low back while demonstrating proper body mechanics for self care independence.-met    8. Patient will improve their Low Back Disability Score to < 20% to allow patient to carry groceries into home without symptom exacerbation.  -progressing

## 2024-07-15 ENCOUNTER — TREATMENT (OUTPATIENT)
Dept: PHYSICAL THERAPY | Facility: CLINIC | Age: 69
End: 2024-07-15
Payer: MEDICARE

## 2024-07-15 DIAGNOSIS — M54.41 LUMBAGO WITH SCIATICA, RIGHT SIDE: ICD-10-CM

## 2024-07-15 DIAGNOSIS — G89.29 OTHER CHRONIC PAIN: ICD-10-CM

## 2024-07-15 PROCEDURE — 97110 THERAPEUTIC EXERCISES: CPT | Mod: GP

## 2024-07-15 PROCEDURE — 97014 ELECTRIC STIMULATION THERAPY: CPT | Mod: GP

## 2024-07-15 PROCEDURE — 97140 MANUAL THERAPY 1/> REGIONS: CPT | Mod: GP

## 2024-07-15 ASSESSMENT — PAIN SCALES - GENERAL: PAINLEVEL_OUTOF10: 4

## 2024-07-15 ASSESSMENT — PAIN - FUNCTIONAL ASSESSMENT: PAIN_FUNCTIONAL_ASSESSMENT: 0-10

## 2024-07-15 NOTE — PROGRESS NOTES
Physical Therapy Treatment  Patient Name: Arabella Hanks  MRN: [id number]  PT Received On: 07/15/24  Time Calculation  Start Time: 1245  Stop Time: 1335  Time Calculation (min): 50 min  PT Modalities Time Entry  E-Stim (Unattended) Time Entry: 10  PT Therapeutic Procedures Time Entry  Manual Therapy Time Entry: 15  Therapeutic Exercise Time Entry: 24  Visit Number: 14/18  Visits/Dates Authorized: [place]    Current Problem:   1. Lumbago with sciatica, right side  Follow Up In Physical Therapy      2. Other chronic pain  Follow Up In Physical Therapy          Precautions: L/R TKR       Subjective   General:  Pt reports her back feels 75% better. Shoulder is still sore.     Pre-Treatment Symptoms:   Pain Assessment: 0-10  0-10 (Numeric) Pain Score: 4    Objective   Findings:   Tenderness R/L lumbar paraspinals, L/R quadratus lumborum tenderness    Midthoracic paraspinal hypertonicity at T5-T8 L/R    Low Back Disability Questionnaire: 5/6/24: 28% impaired (IE 36% impaired)    Strength: L/R hip ext/abd/lumbar ext 4/5, other LE grossly 5/5    R Shoulder flexion/ER/abduction WFL but painful with active motion  R Shoulder resisted ER/flex 4/5, abd 4-/5-painful    C/o pain while rolling L/R in low back, and transferring supine to sit    Treatments:      Therapeutic Exercise:   Tball heel slides x 15  Tball mod bridge x 3 painful  LTR x 10  Open book L/R x 10  Seated Tball lumbar flexion 2x15  Hooklying hip abd light with bridge 2x10 , no pain  Monster walks fwd pink 15 feet x 4  Tloop SS pink 15 feet x4 L/R  RDL off 5 inch step 18-25# 2x10  FT chops 12.5# x 10 L/R  FT single arm high row 10# x12 L/R    DNP:  Over the counter hip ext L/R x 12  Rev hyper x 5  Leg curl 60# 2x10  Shuttle press dbl # x 15, x15, x10  L/R 75# 2x10  KB deadlift onto 9 inch step 18# 2x 10  FT row 10# 2x15  FT chops 15# x 10 L/R  Tloop side step orange 15 feet L/R x 6  FT facepull 2.5# x 10, 5# x 10  Front raise + Tloop shld abd iso pink x  3#  Single arm row L/R 9# 2x10  Tball bridge x 10  Bridge x 10  Prone hip ext 2x12 L/R  Prone back ext 2x8  Sidelying shoulder abd + ER x10  Quadruped weight shift fwd/back x 10  Sit to stand 2x10  FT shld ext 5# 2x10  Side step Tloop orange 15 feet x 2 L/R   Seated cat/cow  x10  Seated on Tball PPT, circles, 2x10 each  Pallof press red x10 L/R  Push/pull red x15  Single arm row red x 15 L/R  Hip IR/ER x10  Tball bridge 2x5  Bridge x 5    Neuromuscular Re-Ed:   Therapeutic Activity:   Gait Training:     Manual Therapy:   Effleurage to L/R thoracic and lumbar paraspinals and thoracolumbar fascia. Manual trigger point release L/R gluteus medius/quadratus lumborum.      Modalities:     Dry needling following informed consent: with e-stim; 100Hz, mA to tolerance, applied to midthoracic and lumbar paraspinals and gluteus medius, for 10 minutes.  Pt lying prone. For pain relief.        Assessment:    Pt reporting low back pain relief after last PT session. Pt was able to lift and move pebble stone in her garden over the weekend without back pain flare up. 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     Post-Treatment Symptoms:     Plan:    Continue PT for 6 additional PT visits.     Goals:       1. Patient to demonstrate the use of proper body mechanics and posture when performing ADL's and picking up objects > 10 lbs to eliminate/reduce their symptoms for self care independence.-met    2. Patient to sleep uninterrupted for 6 hours without being awakened by their pain to allow them to perform ADL's and work effectively during the day. -progressing    3. Patient to be able to load and unload the  without increased pain in their low back and demonstrate proper body mechanics.-met    4. Decrease patient's subjective low back pain to 3/10.-met    5. Patient will be able to  stand > 30 minutes without increased pain to allow patient to prepare meals for self care independence.-progressing    6. Patient to rotate trunk to the left to look over shoulder when driving to safely switch lanes and back up an automobile.-met    7. Patient to be able to load and unload her  without increased pain in their low back while demonstrating proper body mechanics for self care independence.-met    8. Patient will improve their Low Back Disability Score to < 20% to allow patient to carry groceries into home without symptom exacerbation.  -progressing

## 2024-07-18 ENCOUNTER — TREATMENT (OUTPATIENT)
Dept: PHYSICAL THERAPY | Facility: CLINIC | Age: 69
End: 2024-07-18
Payer: MEDICARE

## 2024-07-18 DIAGNOSIS — M54.41 LUMBAGO WITH SCIATICA, RIGHT SIDE: ICD-10-CM

## 2024-07-18 DIAGNOSIS — G89.29 OTHER CHRONIC PAIN: ICD-10-CM

## 2024-07-18 PROCEDURE — 97014 ELECTRIC STIMULATION THERAPY: CPT | Mod: GP

## 2024-07-18 PROCEDURE — 97110 THERAPEUTIC EXERCISES: CPT | Mod: GP

## 2024-07-18 ASSESSMENT — PAIN - FUNCTIONAL ASSESSMENT: PAIN_FUNCTIONAL_ASSESSMENT: 0-10

## 2024-07-18 ASSESSMENT — PAIN SCALES - GENERAL: PAINLEVEL_OUTOF10: 6

## 2024-07-18 NOTE — PROGRESS NOTES
Physical Therapy Treatment  Patient Name: Arabella Hanks  MRN: [id number]  PT Received On: 07/18/24  Time Calculation  Start Time: 1430  Stop Time: 1516  Time Calculation (min): 46 min  PT Modalities Time Entry  E-Stim (Unattended) Time Entry: 15  PT Therapeutic Procedures Time Entry  Therapeutic Exercise Time Entry: 29  Visit Number: 15/18  Visits/Dates Authorized: [place]    Current Problem:   1. Lumbago with sciatica, right side  Follow Up In Physical Therapy      2. Other chronic pain  Follow Up In Physical Therapy          Precautions: L/R TKR       Subjective   General:  Pt reports her back and shoulder are sore from lifting and carrying carri for her garden.     Pre-Treatment Symptoms:   Pain Assessment: 0-10  0-10 (Numeric) Pain Score: 6    Objective   Findings:   Tenderness R/L lumbar paraspinals, L/R quadratus lumborum tenderness    Midthoracic paraspinal hypertonicity at T5-T8 L/R    Low Back Disability Questionnaire: 5/6/24: 28% impaired (IE 36% impaired)    Strength: L/R hip ext/abd/lumbar ext 4/5, other LE grossly 5/5    R Shoulder flexion/ER/abduction WFL but painful with active motion  R Shoulder resisted ER/flex 4/5, abd 4-/5-painful    C/o pain while rolling L/R in low back, and transferring supine to sit    Treatments:      Therapeutic Exercise:   Pulleys x 2 min flex  Tloop row red 2x12  Tloop shld ER grn 2x12  Tloop shld IR grn 2x12  Tloop shld ext red 2x12  RDL off 5 inch step 18-25# 2x10  Shuttle press dbl # x15, x12, x10  Leg curl 50# 2x10  Tloop side step orange 15 feet x 4  Bent over row 12# x 10  Bicep curl 12# x 15      DNP  FT chops 12.5# x 10 L/R  FT single arm high row 10# x12 L/R  Tball heel slides x 15  Tball mod bridge x 3 painful  LTR x 10  Open book L/R x 10  Seated Tball lumbar flexion 2x15  Hooklying hip abd light with bridge 2x10 , no pain  Monster walks fwd pink 15 feet x 4  Tloop SS pink 15 feet x4 L/R  Over the counter hip ext L/R x 12  Rev hyper x 5  Leg curl 60#  2x10  Shuttle press dbl # x 15, x15, x10  L/R 75# 2x10  KB deadlift onto 9 inch step 18# 2x 10  FT row 10# 2x15  FT chops 15# x 10 L/R  Tloop side step orange 15 feet L/R x 6  FT facepull 2.5# x 10, 5# x 10  Front raise + Tloop shld abd iso pink x 3#  Single arm row L/R 9# 2x10  Tball bridge x 10  Bridge x 10  Prone hip ext 2x12 L/R  Prone back ext 2x8  Sidelying shoulder abd + ER x10  Quadruped weight shift fwd/back x 10  Sit to stand 2x10  FT shld ext 5# 2x10  Side step Tloop orange 15 feet x 2 L/R   Seated cat/cow  x10  Seated on Tball PPT, circles, 2x10 each  Pallof press red x10 L/R  Push/pull red x15  Single arm row red x 15 L/R  Hip IR/ER x10  Tball bridge 2x5  Bridge x 5    Neuromuscular Re-Ed:   Therapeutic Activity:   Gait Training:     Manual Therapy:   Deferred:  Effleurage to L/R thoracic and lumbar paraspinals and thoracolumbar fascia. Manual trigger point release L/R gluteus medius/quadratus lumborum.      Modalities:     Dry needling following informed consent: with e-stim; 100Hz, mA to tolerance, applied to R posterior and lateral shoulder,  lumbar paraspinals and gluteus medius, for 10 minutes.  Pt lying prone. For pain relief.        Assessment:    Pt c/o low back and R shoulder pain after lifting and carrying boxes of pebble stones for her flower bed the past few days. Pt continues to work on improving posterior chain strength and manual therapy/dry needling for pain relief.     Post-Treatment Symptoms:     Plan:    Continue PT for 6 additional PT visits.     Goals:       1. Patient to demonstrate the use of proper body mechanics and posture when performing ADL's and picking up objects > 10 lbs to eliminate/reduce their symptoms for self care independence.-met    2. Patient to sleep uninterrupted for 6 hours without being awakened by their pain to allow them to perform ADL's and work effectively during the day. -progressing    3. Patient to be able to load and unload the  without  increased pain in their low back and demonstrate proper body mechanics.-met    4. Decrease patient's subjective low back pain to 3/10.-met    5. Patient will be able to stand > 30 minutes without increased pain to allow patient to prepare meals for self care independence.-progressing    6. Patient to rotate trunk to the left to look over shoulder when driving to safely switch lanes and back up an automobile.-met    7. Patient to be able to load and unload her  without increased pain in their low back while demonstrating proper body mechanics for self care independence.-met    8. Patient will improve their Low Back Disability Score to < 20% to allow patient to carry groceries into home without symptom exacerbation.  -progressing

## 2024-07-22 ENCOUNTER — TREATMENT (OUTPATIENT)
Dept: PHYSICAL THERAPY | Facility: CLINIC | Age: 69
End: 2024-07-22
Payer: MEDICARE

## 2024-07-22 DIAGNOSIS — M54.41 LUMBAGO WITH SCIATICA, RIGHT SIDE: ICD-10-CM

## 2024-07-22 DIAGNOSIS — G89.29 OTHER CHRONIC PAIN: ICD-10-CM

## 2024-07-22 PROCEDURE — 97110 THERAPEUTIC EXERCISES: CPT | Mod: GP

## 2024-07-22 PROCEDURE — 97014 ELECTRIC STIMULATION THERAPY: CPT | Mod: GP

## 2024-07-22 ASSESSMENT — PAIN - FUNCTIONAL ASSESSMENT: PAIN_FUNCTIONAL_ASSESSMENT: 0-10

## 2024-07-22 ASSESSMENT — PAIN SCALES - GENERAL: PAINLEVEL_OUTOF10: 6

## 2024-07-22 NOTE — PROGRESS NOTES
"Physical Therapy Treatment  Patient Name: Arabella Hanks  MRN: [id number]  PT Received On: 07/22/24  Time Calculation  Start Time: 1245  Stop Time: 1345  Time Calculation (min): 60 min  PT Modalities Time Entry  E-Stim (Unattended) Time Entry: 20  PT Therapeutic Procedures Time Entry  Manual Therapy Time Entry: 5  Therapeutic Exercise Time Entry: 30  Visit Number: 16/18  Visits/Dates Authorized: [place]    Current Problem:   1. Lumbago with sciatica, right side  Follow Up In Physical Therapy      2. Other chronic pain  Follow Up In Physical Therapy          Precautions: L/R TKR       Subjective   General:  Pt reports her back and shoulder are still pretty sore. R shoulder was painful while sleeping last night.     Pre-Treatment Symptoms:   Pain Assessment: 0-10  0-10 (Numeric) Pain Score: 6    Objective   Findings:   Tenderness R/L lumbar paraspinals, L/R quadratus lumborum tenderness    Midthoracic paraspinal hypertonicity at T5-T8 L/R    Low Back Disability Questionnaire: 5/6/24: 28% impaired (IE 36% impaired)    Strength: L/R hip ext/abd/lumbar ext 4/5, other LE grossly 5/5    R Shoulder flexion/ER/abduction WFL but painful with active motion  R Shoulder resisted ER/flex 4/5, abd 4-/5-painful    C/o pain while rolling L/R in low back, and transferring supine to sit    Treatments:      Therapeutic Exercise:   Nustep lv 4 x 5min  Hip ER stretch  LTR x 10 L/R  PPT x 15  Seated good morning black x 15  Seated Tball lumbar flexion x15  Shuttle press dbl # x 15, x15, x10  L/R 75# 2x10  Pink Loop steering wheel x 10  Pink loop shld abd iso at 0, 90 and overhead 10\" x 4  Pink loop shld reach pink 2x10      DNP  Pulleys x 2 min flex  Tloop row red 2x12  Tloop shld ER grn 2x12  Tloop shld IR grn 2x12  Tloop shld ext red 2x12  RDL off 5 inch step 18-25# 2x10  Shuttle press dbl # x15, x12, x10  Leg curl 50# 2x10  Tloop side step orange 15 feet x 4  Bent over row 12# x 10  Bicep curl 12# x 15  FT chops 12.5# x 10 " L/R  FT single arm high row 10# x12 L/R  Tball heel slides x 15  Tball mod bridge x 3 painful  LTR x 10  Open book L/R x 10  Seated Tball lumbar flexion 2x15  Hooklying hip abd light with bridge 2x10 , no pain  Monster walks fwd pink 15 feet x 4  Tloop SS pink 15 feet x4 L/R  Over the counter hip ext L/R x 12  Rev hyper x 5  Leg curl 60# 2x10  KB deadlift onto 9 inch step 18# 2x 10  FT row 10# 2x15  FT chops 15# x 10 L/R  Tloop side step orange 15 feet L/R x 6  FT facepull 2.5# x 10, 5# x 10  Front raise + Tloop shld abd iso pink x 3#  Single arm row L/R 9# 2x10  Tball bridge x 10  Bridge x 10  Prone hip ext 2x12 L/R  Prone back ext 2x8  Sidelying shoulder abd + ER x10  Quadruped weight shift fwd/back x 10  Sit to stand 2x10  FT shld ext 5# 2x10  Side step Tloop orange 15 feet x 2 L/R       Neuromuscular Re-Ed:   Therapeutic Activity:   Gait Training:     Manual Therapy:     Effleurage to L/R thoracic and lumbar paraspinals and thoracolumbar fascia. Manual trigger point release L/R gluteus medius/quadratus lumborum.      Modalities:   Unattended e-stim: TENS: applied to R shoulder, Intensity to tolerance, for 10 minutes, in Prone INT: 6    Dry needling following informed consent: with e-stim; 100Hz, mA to tolerance, applied to R/L lumbar paraspinals and gluteus medius, for 10 minutes.  Pt lying prone. For pain relief.        Assessment:    Pt still working on adding stones to [place] garden which has resulted in frequent bending, lifting and carrying heavy stone buckets. Pt continues to have lingering L sided low back pain and R shoulder pain. Pt advised to seek an orthopedic consult for her R shoulder. Pt reported pain relief after exercise and manual therapy for her low back pain.     Post-Treatment Symptoms:     Plan:    Continue PT for 6 additional PT visits.     Goals:       1. Patient to demonstrate the use of proper body mechanics and posture when performing ADL's and picking up objects > 10 lbs to  eliminate/reduce their symptoms for self care independence.-met    2. Patient to sleep uninterrupted for 6 hours without being awakened by their pain to allow them to perform ADL's and work effectively during the day. -progressing    3. Patient to be able to load and unload the  without increased pain in their low back and demonstrate proper body mechanics.-met    4. Decrease patient's subjective low back pain to 3/10.-met    5. Patient will be able to stand > 30 minutes without increased pain to allow patient to prepare meals for self care independence.-progressing    6. Patient to rotate trunk to the left to look over shoulder when driving to safely switch lanes and back up an automobile.-met    7. Patient to be able to load and unload her  without increased pain in their low back while demonstrating proper body mechanics for self care independence.-met    8. Patient will improve their Low Back Disability Score to < 20% to allow patient to carry groceries into home without symptom exacerbation.  -progressing

## 2024-07-25 ENCOUNTER — TREATMENT (OUTPATIENT)
Dept: PHYSICAL THERAPY | Facility: CLINIC | Age: 69
End: 2024-07-25
Payer: MEDICARE

## 2024-07-25 DIAGNOSIS — G89.29 OTHER CHRONIC PAIN: ICD-10-CM

## 2024-07-25 DIAGNOSIS — M54.41 LUMBAGO WITH SCIATICA, RIGHT SIDE: ICD-10-CM

## 2024-07-25 PROCEDURE — 97014 ELECTRIC STIMULATION THERAPY: CPT | Mod: GP

## 2024-07-25 PROCEDURE — 97110 THERAPEUTIC EXERCISES: CPT | Mod: GP

## 2024-07-25 PROCEDURE — 97140 MANUAL THERAPY 1/> REGIONS: CPT | Mod: GP

## 2024-07-25 ASSESSMENT — PAIN - FUNCTIONAL ASSESSMENT: PAIN_FUNCTIONAL_ASSESSMENT: 0-10

## 2024-07-25 ASSESSMENT — PAIN SCALES - GENERAL: PAINLEVEL_OUTOF10: 6

## 2024-07-25 NOTE — PROGRESS NOTES
"Physical Therapy Treatment  Patient Name: Arabella Hanks  MRN: [id number]  PT Received On: 07/25/24  Time Calculation  Start Time: 1120  Stop Time: 1210  Time Calculation (min): 50 min  PT Modalities Time Entry  E-Stim (Unattended) Time Entry: 15  PT Therapeutic Procedures Time Entry  Manual Therapy Time Entry: 10  Therapeutic Exercise Time Entry: 21  Visit Number: 17/18  Visits/Dates Authorized: [place]    Current Problem:   1. Lumbago with sciatica, right side  Follow Up In Physical Therapy      2. Other chronic pain  Follow Up In Physical Therapy          Precautions: L/R TKR       Subjective   General:  Pt reports her back is sore after moving pebble stone and weeding her [place] garden last few days.     Pre-Treatment Symptoms:   Pain Assessment: 0-10  0-10 (Numeric) Pain Score: 6    Objective   Findings:   Tenderness R/L lumbar paraspinals, L/R quadratus lumborum tenderness    Midthoracic paraspinal hypertonicity at T5-T8 L/R    Low Back Disability Questionnaire: 5/6/24: 28% impaired (IE 36% impaired)    Strength: L/R hip ext/abd/lumbar ext 4/5, other LE grossly 5/5    R Shoulder flexion/ER/abduction WFL but painful with active motion  R Shoulder resisted ER/flex 4/5, abd 4-/5-painful    C/o pain while rolling L/R in low back, and transferring supine to sit    Treatments:      Therapeutic Exercise:   Nustep lv 5 x  4min  LTR x 10 L/R  PPT x 15  Shuttle press dbl # x 15, x15, x10  L/R 75# 2x10  Prone hip ext x10 L/R  Prone back ext 2x10  Seated hip IR 2x12 orange  Seated lumbar flexion AROM x 10  Seated lumbar ext iso 10\" x 5    DNP  Pulleys x 2 min flex  Tloop row red 2x12  Tloop shld ER grn 2x12  Tloop shld IR grn 2x12  Tloop shld ext red 2x12  RDL off 5 inch step 18-25# 2x10  Shuttle press dbl # x15, x12, x10  Leg curl 50# 2x10  Tloop side step orange 15 feet x 4  Bent over row 12# x 10  Bicep curl 12# x 15  FT chops 12.5# x 10 L/R  FT single arm high row 10# x12 L/R  Tball heel slides x 15  Tball mod " bridge x 3 painful  LTR x 10  Open book L/R x 10  Seated Tball lumbar flexion 2x15  Hooklying hip abd light with bridge 2x10 , no pain  Monster walks fwd pink 15 feet x 4  Tloop SS pink 15 feet x4 L/R  Over the counter hip ext L/R x 12  Rev hyper x 5  Leg curl 60# 2x10  KB deadlift onto 9 inch step 18# 2x 10  FT row 10# 2x15  FT chops 15# x 10 L/R  Tloop side step orange 15 feet L/R x 6  FT facepull 2.5# x 10, 5# x 10  Front raise + Tloop shld abd iso pink x 3#  Single arm row L/R 9# 2x10  Tball bridge x 10  Bridge x 10  Prone hip ext 2x12 L/R  Prone back ext 2x8  Sidelying shoulder abd + ER x10  Quadruped weight shift fwd/back x 10  Sit to stand 2x10  FT shld ext 5# 2x10  Side step Tloop orange 15 feet x 2 L/R     Neuromuscular Re-Ed:   Therapeutic Activity:   Gait Training:     Manual Therapy:     Effleurage to L/R thoracic and lumbar paraspinals and thoracolumbar fascia. Manual trigger point release L gluteus medius/quadratus lumborum.      Modalities:       Dry needling following informed consent: with e-stim; 100Hz, mA to tolerance, applied to R/L lumbar paraspinals and L/ R gluteus medius, for 10 minutes.  Pt R sidelying. For pain relief.        Assessment:    Pt still having lingering low back pain that gets exacerbated with yard work and while house cleaning. Pt continues to work on lumbar AROM and posterior chain strengthening with manual therapy for pain relief. Pt reports several days of pain relief after PT sessions.     Post-Treatment Symptoms:     Plan:    Continue PT for 6 additional PT visits.     Goals:       1. Patient to demonstrate the use of proper body mechanics and posture when performing ADL's and picking up objects > 10 lbs to eliminate/reduce their symptoms for self care independence.-met    2. Patient to sleep uninterrupted for 6 hours without being awakened by their pain to allow them to perform ADL's and work effectively during the day. -progressing    3. Patient to be able to load and  unload the  without increased pain in their low back and demonstrate proper body mechanics.-met    4. Decrease patient's subjective low back pain to 3/10.-met    5. Patient will be able to stand > 30 minutes without increased pain to allow patient to prepare meals for self care independence.-progressing    6. Patient to rotate trunk to the left to look over shoulder when driving to safely switch lanes and back up an automobile.-met    7. Patient to be able to load and unload her  without increased pain in their low back while demonstrating proper body mechanics for self care independence.-met    8. Patient will improve their Low Back Disability Score to < 20% to allow patient to carry groceries into home without symptom exacerbation.  -progressing

## 2024-07-29 ENCOUNTER — TREATMENT (OUTPATIENT)
Dept: PHYSICAL THERAPY | Facility: CLINIC | Age: 69
End: 2024-07-29
Payer: MEDICARE

## 2024-07-29 DIAGNOSIS — M54.41 LUMBAGO WITH SCIATICA, RIGHT SIDE: ICD-10-CM

## 2024-07-29 DIAGNOSIS — G89.29 OTHER CHRONIC PAIN: ICD-10-CM

## 2024-07-29 PROCEDURE — 97110 THERAPEUTIC EXERCISES: CPT | Mod: GP

## 2024-07-29 PROCEDURE — 97014 ELECTRIC STIMULATION THERAPY: CPT | Mod: GP

## 2024-07-29 ASSESSMENT — PAIN SCALES - GENERAL: PAINLEVEL_OUTOF10: 6

## 2024-07-29 ASSESSMENT — PAIN - FUNCTIONAL ASSESSMENT: PAIN_FUNCTIONAL_ASSESSMENT: 0-10

## 2024-07-29 NOTE — LETTER
July 29, 2024    Cheryl Fletcher DO  [address] [facility] [address]    Patient: Arabella Hanks   YOB: 1955   Date of Visit: 7/29/2024       Dear Cheryl Fletcher DO  [address] [place] [address]    The attached plan of care is being sent to you because your patient’s medical reimbursement requires that you certify the plan of care. Your signature is required to allow uninterrupted insurance coverage.      You may indicate your approval by signing below and faxing this form back to us at Dept Fax: [phone].    Please call Dept: [phone] with any questions or concerns.    Thank you for this referral,        Paul Montoya PT  [facility] PHYSICIAN [place]  [facility] PHYSICIAN AL[place]ERTO  [address] [facility] [id number] [phone]    Payer: Payor: MEDICAL [facility] MEDICARE / Plan: [facility] MEDICARE / Product Type: *No Product type* /                                                                         Date:     Dear Paul Montoya PT,     Re: Ms. Arabella Hanks, MRN:[id number]    I certify that I have reviewed the attached plan of care and it is medically necessary for Ms. Arabella Hanks (1955) who is under my care.          ______________________________________                    _________________  Provider name and credentials                                           Date and time                                                                                           Plan of Care 7/29/24   Effective from: 7/29/2024  Effective to: 9/27/2024    Plan ID: [id number]            Participants as of Finalize on 7/29/2024    Name Type Comments Contact Info    Cheryl Fletcher DO Primary Care Provider  [phone]    Paul Montoya PT Physical Therapist  [phone]       Last Plan Note     Author: Paul Montoya PT Status: Sign when Signing Visit Last edited: 7/29/2024 12:45 PM       Physical Therapy Treatment/Progress  Note  Patient Name: Arabella Hanks  MRN: [id number]  PT Received On: 07/29/24  Time Calculation  Start Time: 1245  Stop Time: 1340  Time Calculation (min): 55 min  PT Modalities Time Entry  E-Stim (Unattended) Time Entry: 15  PT Therapeutic Procedures Time Entry  Manual Therapy Time Entry: 10  Therapeutic Exercise Time Entry: 25  Visit Number: 18/18  Visits/Dates Authorized: [place]    Current Problem:   1. Lumbago with sciatica, right side  Follow Up In Physical Therapy      2. Other chronic pain  Follow Up In Physical Therapy          Precautions: L/R TKR       Subjective   General:  Pt states she felt good the past few days but had her back pain return yesterday. Sore to move today due to L sided low back pain.     Pre-Treatment Symptoms:   Pain Assessment: 0-10  0-10 (Numeric) Pain Score: 6    Objective   Findings:   Tenderness R/L lumbar paraspinals, L/R quadratus lumborum tenderness    Midthoracic paraspinal hypertonicity at T5-T8 L/R    Low Back Disability Questionnaire: 7/29/24: 34% impaired; 5/6/24: 28% impaired (IE 36% impaired)    Strength: L/R hip ext/abd/lumbar ext 4/5, other LE grossly 5/5    R Shoulder flexion/ER/abduction WFL but painful with active motion  R Shoulder resisted ER/flex 4/5, abd 4-/5-painful    5/10 -avg, back  8/10 - worst, jabbing pain, L sided lumbosacral region    Treatments:      Therapeutic Exercise:   Nustep lv 5 x  5 min  FT chops 12.5# x 10 L/R  Over the counter hip ext L/R x 12  Rev hyper 2x 5  Leg curl 55# 2x10  KB deadlift onto 7 inch step 18-26# 2x 10  FT trunk rot 5# 2x10  Tloop side step orange 15 feet x 4  Monster walks fwd orange 15 feet x 4      DNP  LTR x 10 L/R  PPT x 15  Shuttle press dbl # x 15, x15, x10  L/R 75# 2x10  Pulleys x 2 min flex  Tloop row red 2x12  Tloop shld ER grn 2x12  Tloop shld IR grn 2x12  Tloop shld ext red 2x12  Shuttle press dbl # x15, x12, x10  Prone hip ext x10 L/R  Prone back ext 2x10  Seated hip IR 2x12 orange  Seated lumbar  "flexion AROM x 10  Seated lumbar ext iso 10\" x 5  Tloop side step orange 15 feet x 4  Bent over row 12# x 10  Bicep curl 12# x 15  FT single arm high row 10# x12 L/R  Tball heel slides x 15  Tball mod bridge x 3 painful  LTR x 10  Open book L/R x 10  Seated Tball lumbar flexion 2x15  Hooklying hip abd light with bridge 2x10 , no pain  FT row 10# 2x15  FT chops 15# x 10 L/R  Tloop side step orange 15 feet L/R x 6  FT facepull 2.5# x 10, 5# x 10  Front raise + Tloop shld abd iso pink x 3#  Single arm row L/R 9# 2x10  Tball bridge x 10  Bridge x 10  Prone hip ext 2x12 L/R  Prone back ext 2x8  Sidelying shoulder abd + ER x10  Quadruped weight shift fwd/back x 10  Sit to stand 2x10  FT shld ext 5# 2x10  Side step Tloop orange 15 feet x 2 L/R     Neuromuscular Re-Ed:   Therapeutic Activity:   Gait Training:     Manual Therapy:     Effleurage to L/R thoracic and lumbar paraspinals and thoracolumbar fascia. Manual trigger point release L gluteus medius/quadratus lumborum.      Modalities:       Dry needling following informed consent: with e-stim; 100Hz, mA to tolerance, applied to R/L lumbar paraspinals and L/ R gluteus medius, for 10 minutes.  Pt R sidelying. For pain relief.        Assessment:    Pt still having c/o intermittent low back pain. Pain is exacerbated by yard work or lifting. Pain currently 5-8/10 depending on activity. Pt had a recent back pain flare up while working in her [place] garden and lifting and carrying/moving pebble stones. Pt would benefit from 6 additional PT visits to address her most recent back pain flare up.     Post-Treatment Symptoms:     Plan:    Continue PT for 6 additional PT visits.     Goals:       1. Patient to demonstrate the use of proper body mechanics and posture when performing ADL's and picking up objects > 10 lbs to eliminate/reduce their symptoms for self care independence.-met    2. Patient to sleep uninterrupted for 6 hours without being awakened by their pain to allow them to " perform ADL's and work effectively during the day. -progressing    3. Patient to be able to load and unload the  without increased pain in their low back and demonstrate proper body mechanics.-met    4. Decrease patient's subjective low back pain to 3/10.-progressing    5. Patient will be able to stand > 30 minutes without increased pain to allow patient to prepare meals for self care independence.-progressing    6. Patient to rotate trunk to the left to look over shoulder when driving to safely switch lanes and back up an automobile.-met    7. Patient to be able to load and unload her  without increased pain in their low back while demonstrating proper body mechanics for self care independence.-met    8. Patient will improve their Low Back Disability Score to < 20% to allow patient to carry groceries into home without symptom exacerbation.  -progressing             Current Participants as of 7/29/2024    Name Type Comments Contact Info    Cheryl Fletcher DO Primary Care Provider  [phone]    Signature pending    Paul Montoya PT Physical Therapist  [phone]    Signature pending

## 2024-07-29 NOTE — LETTER
July 29, 2024    Cheryl Fletcher DO  [address] [facility] [address]    Patient: Arabella Hanks   YOB: 1955   Date of Visit: 7/29/2024       Dear Cheryl Fletcher DO  [address] [place] [address]    The attached plan of care is being sent to you because your patient’s medical reimbursement requires that you certify the plan of care. Your signature is required to allow uninterrupted insurance coverage.      You may indicate your approval by signing below and faxing this form back to us at Dept Fax: [phone].    Please call Dept: [phone] with any questions or concerns.    Thank you for this referral,        Paul Montoya PT  [facility] PHYSICIAN [place]  [facility] PHYSICIAN [place]ERTO  [address] [facility] [id number] [phone]    Payer: Payor: MEDICAL [facility] MEDICARE / Plan: [facility] MEDICARE / Product Type: *No Product type* /                                                                         Date:     Dear Paul Montoya PT,     Re: Ms. Arabella Hanks, MRN:[id number]    I certify that I have reviewed the attached plan of care and it is medically necessary for Ms. Arabella Hanks (1955) who is under my care.          ______________________________________                    _________________  Provider name and credentials                                           Date and time                                                                                           Plan of Care 7/29/24   Effective from: 7/29/2024  Effective to: 9/27/2024    Plan ID: [id number]                Participants as of Finalize on 7/29/2024      Name Type Comments Contact Info    Cheryl Fletcher DO Primary Care Provider  [phone]    Paul Montoya PT Physical Therapist  [phone]           Last Plan Note       Author: Paul Montoya PT Status: Sign when Signing Visit Last edited: 7/29/2024 12:45 PM         Physical Therapy Treatment/Progress  Note  Patient Name: Arabella Hanks  MRN: [id number]  PT Received On: 07/29/24  Time Calculation  Start Time: 1245  Stop Time: 1340  Time Calculation (min): 55 min  PT Modalities Time Entry  E-Stim (Unattended) Time Entry: 15  PT Therapeutic Procedures Time Entry  Manual Therapy Time Entry: 10  Therapeutic Exercise Time Entry: 25  Visit Number: 18/18  Visits/Dates Authorized: MN    Current Problem:   1. Lumbago with sciatica, right side  Follow Up In Physical Therapy      2. Other chronic pain  Follow Up In Physical Therapy          Precautions: L/R TKR       Subjective   General:  Pt states she felt good the past few days but had her back pain return yesterday. Sore to move today due to L sided low back pain.     Pre-Treatment Symptoms:   Pain Assessment: 0-10  0-10 (Numeric) Pain Score: 6    Objective   Findings:   Tenderness R/L lumbar paraspinals, L/R quadratus lumborum tenderness    Midthoracic paraspinal hypertonicity at T5-T8 L/R    Low Back Disability Questionnaire: 7/29/24: 34% impaired; 5/6/24: 28% impaired (IE 36% impaired)    Strength: L/R hip ext/abd/lumbar ext 4/5, other LE grossly 5/5    R Shoulder flexion/ER/abduction WFL but painful with active motion  R Shoulder resisted ER/flex 4/5, abd 4-/5-painful    5/10 -avg, back  8/10 - worst, jabbing pain, L sided lumbosacral region    Treatments:      Therapeutic Exercise:   Nustep lv 5 x  5 min  FT chops 12.5# x 10 L/R  Over the counter hip ext L/R x 12  Rev hyper 2x 5  Leg curl 55# 2x10  KB deadlift onto 7 inch step 18-26# 2x 10  FT trunk rot 5# 2x10  Tloop side step orange 15 feet x 4  Monster walks fwd orange 15 feet x 4      DNP  LTR x 10 L/R  PPT x 15  Shuttle press dbl # x 15, x15, x10  L/R 75# 2x10  Pulleys x 2 min flex  Tloop row red 2x12  Tloop shld ER grn 2x12  Tloop shld IR grn 2x12  Tloop shld ext red 2x12  Shuttle press dbl # x15, x12, x10  Prone hip ext x10 L/R  Prone back ext 2x10  Seated hip IR 2x12 orange  Seated lumbar  "flexion AROM x 10  Seated lumbar ext iso 10\" x 5  Tloop side step orange 15 feet x 4  Bent over row 12# x 10  Bicep curl 12# x 15  FT single arm high row 10# x12 L/R  Tball heel slides x 15  Tball mod bridge x 3 painful  LTR x 10  Open book L/R x 10  Seated Tball lumbar flexion 2x15  Hooklying hip abd light with bridge 2x10 , no pain  FT row 10# 2x15  FT chops 15# x 10 L/R  Tloop side step orange 15 feet L/R x 6  FT facepull 2.5# x 10, 5# x 10  Front raise + Tloop shld abd iso pink x 3#  Single arm row L/R 9# 2x10  Tball bridge x 10  Bridge x 10  Prone hip ext 2x12 L/R  Prone back ext 2x8  Sidelying shoulder abd + ER x10  Quadruped weight shift fwd/back x 10  Sit to stand 2x10  FT shld ext 5# 2x10  Side step Tloop orange 15 feet x 2 L/R     Neuromuscular Re-Ed:   Therapeutic Activity:   Gait Training:     Manual Therapy:     Effleurage to L/R thoracic and lumbar paraspinals and thoracolumbar fascia. Manual trigger point release L gluteus medius/quadratus lumborum.      Modalities:       Dry needling following informed consent: with e-stim; 100Hz, mA to tolerance, applied to R/L lumbar paraspinals and L/ R gluteus medius, for 10 minutes.  Pt R sidelying. For pain relief.        Assessment:    Pt still having c/o intermittent low back pain. Pain is exacerbated by yard work or lifting. Pain currently 5-8/10 depending on activity. Pt had a recent back pain flare up while working in her [place] garden and lifting and carrying/moving pebble stones. Pt would benefit from 6 additional PT visits to address her most recent back pain flare up.     Post-Treatment Symptoms:     Plan:    Continue PT for 6 additional PT visits.     Goals:       1. Patient to demonstrate the use of proper body mechanics and posture when performing ADL's and picking up objects > 10 lbs to eliminate/reduce their symptoms for self care independence.-met    2. Patient to sleep uninterrupted for 6 hours without being awakened by their pain to allow them to " perform ADL's and work effectively during the day. -progressing    3. Patient to be able to load and unload the  without increased pain in their low back and demonstrate proper body mechanics.-met    4. Decrease patient's subjective low back pain to 3/10.-progressing    5. Patient will be able to stand > 30 minutes without increased pain to allow patient to prepare meals for self care independence.-progressing    6. Patient to rotate trunk to the left to look over shoulder when driving to safely switch lanes and back up an automobile.-met    7. Patient to be able to load and unload her  without increased pain in their low back while demonstrating proper body mechanics for self care independence.-met    8. Patient will improve their Low Back Disability Score to < 20% to allow patient to carry groceries into home without symptom exacerbation.  -progressing             Current Participants as of 7/29/2024      Name Type Comments Contact Info    Cheryl Fletcher DO Primary Care Provider  [phone]    Signature pending    Paul Montoya PT Physical Therapist  [phone]    Electronically signed by Paul Montoya PT at 7/29/2024 4323 EDT

## 2024-07-29 NOTE — PROGRESS NOTES
Physical Therapy Treatment/Progress Note  Patient Name: Arabella Hanks  MRN: [id number]  PT Received On: 07/29/24  Time Calculation  Start Time: 1245  Stop Time: 1340  Time Calculation (min): 55 min  PT Modalities Time Entry  E-Stim (Unattended) Time Entry: 15  PT Therapeutic Procedures Time Entry  Manual Therapy Time Entry: 10  Therapeutic Exercise Time Entry: 25  Visit Number: 18/18  Visits/Dates Authorized: [place]    Current Problem:   1. Lumbago with sciatica, right side  Follow Up In Physical Therapy      2. Other chronic pain  Follow Up In Physical Therapy          Precautions: L/R TKR       Subjective   General:  Pt states she felt good the past few days but had her back pain return yesterday. Sore to move today due to L sided low back pain.     Pre-Treatment Symptoms:   Pain Assessment: 0-10  0-10 (Numeric) Pain Score: 6    Objective   Findings:   Tenderness R/L lumbar paraspinals, L/R quadratus lumborum tenderness    Midthoracic paraspinal hypertonicity at T5-T8 L/R    Low Back Disability Questionnaire: 7/29/24: 34% impaired; 5/6/24: 28% impaired (IE 36% impaired)    Strength: L/R hip ext/abd/lumbar ext 4/5, other LE grossly 5/5    R Shoulder flexion/ER/abduction WFL but painful with active motion  R Shoulder resisted ER/flex 4/5, abd 4-/5-painful    5/10 -avg, back  8/10 - worst, jabbing pain, L sided lumbosacral region    Treatments:      Therapeutic Exercise:   Nustep lv 5 x  5 min  FT chops 12.5# x 10 L/R  Over the counter hip ext L/R x 12  Rev hyper 2x 5  Leg curl 55# 2x10  KB deadlift onto 7 inch step 18-26# 2x 10  FT trunk rot 5# 2x10  Tloop side step orange 15 feet x 4  Monster walks fwd orange 15 feet x 4      DNP  LTR x 10 L/R  PPT x 15  Shuttle press dbl # x 15, x15, x10  L/R 75# 2x10  Pulleys x 2 min flex  Tloop row red 2x12  Tloop shld ER grn 2x12  Tloop shld IR grn 2x12  Tloop shld ext red 2x12  Shuttle press dbl # x15, x12, x10  Prone hip ext x10 L/R  Prone back ext 2x10  Seated hip  "IR 2x12 orange  Seated lumbar flexion AROM x 10  Seated lumbar ext iso 10\" x 5  Tloop side step orange 15 feet x 4  Bent over row 12# x 10  Bicep curl 12# x 15  FT single arm high row 10# x12 L/R  Tball heel slides x 15  Tball mod bridge x 3 painful  LTR x 10  Open book L/R x 10  Seated Tball lumbar flexion 2x15  Hooklying hip abd light with bridge 2x10 , no pain  FT row 10# 2x15  FT chops 15# x 10 L/R  Tloop side step orange 15 feet L/R x 6  FT facepull 2.5# x 10, 5# x 10  Front raise + Tloop shld abd iso pink x 3#  Single arm row L/R 9# 2x10  Tball bridge x 10  Bridge x 10  Prone hip ext 2x12 L/R  Prone back ext 2x8  Sidelying shoulder abd + ER x10  Quadruped weight shift fwd/back x 10  Sit to stand 2x10  FT shld ext 5# 2x10  Side step Tloop orange 15 feet x 2 L/R     Neuromuscular Re-Ed:   Therapeutic Activity:   Gait Training:     Manual Therapy:     Effleurage to L/R thoracic and lumbar paraspinals and thoracolumbar fascia. Manual trigger point release L gluteus medius/quadratus lumborum.      Modalities:       Dry needling following informed consent: with e-stim; 100Hz, mA to tolerance, applied to R/L lumbar paraspinals and L/ R gluteus medius, for 10 minutes.  Pt R sidelying. For pain relief.        Assessment:    Pt still having c/o intermittent low back pain. Pain is exacerbated by yard work or lifting. Pain currently 5-8/10 depending on activity. Pt had a recent back pain flare up while working in her [place] garden and lifting and carrying/moving pebble stones. Pt would benefit from 6 additional PT visits to address her most recent back pain flare up.     Post-Treatment Symptoms:     Plan:    Continue PT for 6 additional PT visits.     Goals:       1. Patient to demonstrate the use of proper body mechanics and posture when performing ADL's and picking up objects > 10 lbs to eliminate/reduce their symptoms for self care independence.-met    2. Patient to sleep uninterrupted for 6 hours without being awakened " by their pain to allow them to perform ADL's and work effectively during the day. -progressing    3. Patient to be able to load and unload the  without increased pain in their low back and demonstrate proper body mechanics.-met    4. Decrease patient's subjective low back pain to 3/10.-progressing    5. Patient will be able to stand > 30 minutes without increased pain to allow patient to prepare meals for self care independence.-progressing    6. Patient to rotate trunk to the left to look over shoulder when driving to safely switch lanes and back up an automobile.-met    7. Patient to be able to load and unload her  without increased pain in their low back while demonstrating proper body mechanics for self care independence.-met    8. Patient will improve their Low Back Disability Score to < 20% to allow patient to carry groceries into home without symptom exacerbation.  -progressing

## 2024-08-22 ENCOUNTER — TREATMENT (OUTPATIENT)
Dept: PHYSICAL THERAPY | Facility: CLINIC | Age: 69
End: 2024-08-22
Payer: MEDICARE

## 2024-08-22 DIAGNOSIS — G89.29 OTHER CHRONIC PAIN: ICD-10-CM

## 2024-08-22 DIAGNOSIS — M54.41 LUMBAGO WITH SCIATICA, RIGHT SIDE: ICD-10-CM

## 2024-08-22 PROCEDURE — 97140 MANUAL THERAPY 1/> REGIONS: CPT | Mod: GP

## 2024-08-22 PROCEDURE — 97110 THERAPEUTIC EXERCISES: CPT | Mod: GP

## 2024-08-22 PROCEDURE — 97014 ELECTRIC STIMULATION THERAPY: CPT | Mod: GP

## 2024-08-22 ASSESSMENT — PAIN SCALES - GENERAL: PAINLEVEL_OUTOF10: 7

## 2024-08-22 ASSESSMENT — PAIN - FUNCTIONAL ASSESSMENT: PAIN_FUNCTIONAL_ASSESSMENT: 0-10

## 2024-08-22 NOTE — PROGRESS NOTES
Physical Therapy Treatment  Patient Name: Arabella Hanks  MRN: [id number]     Time Calculation  Start Time: 1630  Stop Time: 1713  Time Calculation (min): 43 min  PT Modalities Time Entry  E-Stim (Unattended) Time Entry: 10  PT Therapeutic Procedures Time Entry  Manual Therapy Time Entry: 10  Therapeutic Exercise Time Entry: 15  Visit Number: 18/24  Visits/Dates Authorized: MN    Current Problem:   1. Lumbago with sciatica, right side  Follow Up In Physical Therapy      2. Other chronic pain  Follow Up In Physical Therapy          Precautions: L/R TKR       Subjective   General:  Pt states her back is pretty sore today. Haven't tried exercises at home or walking at home as suggested.     Pre-Treatment Symptoms:   Pain Assessment: 0-10  0-10 (Numeric) Pain Score: 7    Objective   Findings:   Tenderness R/L lumbar paraspinals, L/R quadratus lumborum tenderness    Midthoracic paraspinal hypertonicity at T5-T8 L/R    Low Back Disability Questionnaire: 7/29/24: 34% impaired; 5/6/24: 28% impaired (IE 36% impaired)    Strength: L/R hip ext/abd/lumbar ext 4/5, other LE grossly 5/5    R Shoulder flexion/ER/abduction WFL but painful with active motion  R Shoulder resisted ER/flex 4/5, abd 4-/5-painful    5/10 -avg, back  8/10 - worst, jabbing pain, L sided lumbosacral region    Treatments:      Therapeutic Exercise:   Nustep lv 5 x  5 min  Shuttle press dbl # x 15, x15, x10  L/R 75# 2x10  KB deadlift onto 7 inch step 18-26# 2x 10  Standing thoracic rotation L/R x 10  Tloop hip abd orange 2x10 L/R  Tloop hip ext orange 2x10 L/R    DNP  FT chops 12.5# x 10 L/R  Over the counter hip ext L/R x 12  Rev hyper 2x 5  Leg curl 55# 2x10  LTR x 10 L/R  PPT x 15  FT trunk rot 5# 2x10  Tloop side step orange 15 feet x 4  Monster walks fwd orange 15 feet x 4  Pulleys x 2 min flex  Tloop row red 2x12  Tloop shld ER grn 2x12  Tloop shld IR grn 2x12  Tloop shld ext red 2x12  Shuttle press dbl # x15, x12, x10  Prone hip ext x10  "L/R  Prone back ext 2x10  Seated hip IR 2x12 orange  Seated lumbar flexion AROM x 10  Seated lumbar ext iso 10\" x 5  Tloop side step orange 15 feet x 4  Bent over row 12# x 10  Bicep curl 12# x 15  FT single arm high row 10# x12 L/R  Tball heel slides x 15  Tball mod bridge x 3 painful  LTR x 10  Open book L/R x 10  Seated Tball lumbar flexion 2x15  Hooklying hip abd light with bridge 2x10 , no pain  FT row 10# 2x15  FT chops 15# x 10 L/R  Tloop side step orange 15 feet L/R x 6  FT facepull 2.5# x 10, 5# x 10  Front raise + Tloop shld abd iso pink x 3#  Single arm row L/R 9# 2x10  Tball bridge x 10  Bridge x 10  Prone hip ext 2x12 L/R  Prone back ext 2x8  Sidelying shoulder abd + ER x10  Quadruped weight shift fwd/back x 10  Sit to stand 2x10  FT shld ext 5# 2x10  Side step Tloop orange 15 feet x 2 L/R     Neuromuscular Re-Ed:   Therapeutic Activity:   Gait Training:     Manual Therapy:     Effleurage to L/R thoracic and lumbar paraspinals and thoracolumbar fascia. Manual trigger point release L gluteus medius/quadratus lumborum.      Modalities:       Dry needling following informed consent: with e-stim; 4Hz, mA to tolerance, applied to R/L lumbar paraspinals and L/ R gluteus medius, for 10 minutes.  Pt lying prone      Assessment:    Pt low back was bothering her today. 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 Pt still having pain relief that lasts several days with exercise, dry needling and manual therapy.     Post-Treatment Symptoms:     Plan:    Continue PT for 6 additional PT visits.     Goals:       1. Patient to demonstrate the use of proper body mechanics and posture when performing ADL's and picking up objects > 10 lbs to eliminate/reduce their symptoms for self care independence.-met    2. Patient to sleep uninterrupted for 6 hours without being awakened by their pain to allow them to perform ADL's and work effectively during the day. -progressing    3. Patient to be able to load and unload " the  without increased pain in their low back and demonstrate proper body mechanics.-met    4. Decrease patient's subjective low back pain to 3/10.-progressing    5. Patient will be able to stand > 30 minutes without increased pain to allow patient to prepare meals for self care independence.-progressing    6. Patient to rotate trunk to the left to look over shoulder when driving to safely switch lanes and back up an automobile.-met    7. Patient to be able to load and unload her  without increased pain in their low back while demonstrating proper body mechanics for self care independence.-met    8. Patient will improve their Low Back Disability Score to < 20% to allow patient to carry groceries into home without symptom exacerbation.  -progressing

## 2024-08-23 ENCOUNTER — TELEPHONE (OUTPATIENT)
Dept: ORTHOPEDIC SURGERY | Facility: CLINIC | Age: 69
End: 2024-08-23
Payer: MEDICARE

## 2024-08-23 NOTE — TELEPHONE ENCOUNTER
Arabella called requesting a copy of her last visit with Dr. Crawford be emailed over to her email on file. I did send her a copy this afternoon.

## 2024-08-26 ENCOUNTER — APPOINTMENT (OUTPATIENT)
Dept: ORTHOPEDIC SURGERY | Facility: CLINIC | Age: 69
End: 2024-08-26
Payer: MEDICARE

## 2024-08-26 ENCOUNTER — HOSPITAL ENCOUNTER (OUTPATIENT)
Dept: RADIOLOGY | Facility: CLINIC | Age: 69
Discharge: HOME | End: 2024-08-26
Payer: MEDICARE

## 2024-08-26 DIAGNOSIS — M25.511 RIGHT SHOULDER PAIN, UNSPECIFIED CHRONICITY: ICD-10-CM

## 2024-08-26 DIAGNOSIS — M75.41 SHOULDER IMPINGEMENT SYNDROME, RIGHT: Primary | ICD-10-CM

## 2024-08-26 DIAGNOSIS — M65.341 TRIGGER FINGER, RIGHT RING FINGER: ICD-10-CM

## 2024-08-26 PROCEDURE — 73030 X-RAY EXAM OF SHOULDER: CPT | Mod: RT

## 2024-08-26 PROCEDURE — 1159F MED LIST DOCD IN RCRD: CPT | Performed by: ORTHOPAEDIC SURGERY

## 2024-08-26 PROCEDURE — 1160F RVW MEDS BY RX/DR IN RCRD: CPT | Performed by: ORTHOPAEDIC SURGERY

## 2024-08-26 PROCEDURE — 99203 OFFICE O/P NEW LOW 30 MIN: CPT | Performed by: ORTHOPAEDIC SURGERY

## 2024-08-26 PROCEDURE — 1036F TOBACCO NON-USER: CPT | Performed by: ORTHOPAEDIC SURGERY

## 2024-08-26 PROCEDURE — 73030 X-RAY EXAM OF SHOULDER: CPT | Mod: RIGHT SIDE | Performed by: RADIOLOGY

## 2024-08-26 PROCEDURE — 1125F AMNT PAIN NOTED PAIN PRSNT: CPT | Performed by: ORTHOPAEDIC SURGERY

## 2024-08-26 PROCEDURE — 20611 DRAIN/INJ JOINT/BURSA W/US: CPT | Performed by: ORTHOPAEDIC SURGERY

## 2024-08-26 ASSESSMENT — PAIN - FUNCTIONAL ASSESSMENT: PAIN_FUNCTIONAL_ASSESSMENT: 0-10

## 2024-08-26 ASSESSMENT — PAIN SCALES - GENERAL: PAINLEVEL_OUTOF10: 6

## 2024-08-27 PROCEDURE — 20550 NJX 1 TENDON SHEATH/LIGAMENT: CPT | Performed by: ORTHOPAEDIC SURGERY

## 2024-08-27 PROCEDURE — 76942 ECHO GUIDE FOR BIOPSY: CPT | Performed by: ORTHOPAEDIC SURGERY

## 2024-08-27 RX ORDER — METHYLPREDNISOLONE ACETATE 40 MG/ML
20 INJECTION, SUSPENSION INTRA-ARTICULAR; INTRALESIONAL; INTRAMUSCULAR; SOFT TISSUE
Status: COMPLETED | OUTPATIENT
Start: 2024-08-27 | End: 2024-08-27

## 2024-08-27 RX ORDER — LIDOCAINE HYDROCHLORIDE 10 MG/ML
3 INJECTION INFILTRATION; PERINEURAL
Status: COMPLETED | OUTPATIENT
Start: 2024-08-26 | End: 2024-08-26

## 2024-08-27 RX ORDER — LIDOCAINE HYDROCHLORIDE 10 MG/ML
1 INJECTION INFILTRATION; PERINEURAL
Status: COMPLETED | OUTPATIENT
Start: 2024-08-27 | End: 2024-08-27

## 2024-08-27 ASSESSMENT — ENCOUNTER SYMPTOMS
WHEEZING: 0
EYE DISCHARGE: 0
COLOR CHANGE: 0
CHILLS: 0
JOINT SWELLING: 1
FEVER: 0
SHORTNESS OF BREATH: 0
ARTHRALGIAS: 1
TROUBLE SWALLOWING: 0
SINUS PRESSURE: 0

## 2024-08-27 NOTE — PROGRESS NOTES
Reason for Appointment  Chief Complaint   Patient presents with    Right Shoulder - Pain     History of Present Illness  New patient is a 68 y.o. female here today for evaluation of her right shoulder and right hand.  For last few months she has had increased lateral sided right shoulder pain, pain is worse at night and with lifting.  X-rays taken today of the right shoulder are reviewed and show AC joint DJD only.  She is also had locking and catching of the right ring finger, she has pain with heavy gripping and some swelling in the area.  No numbness or tingling in the hand.    Past Medical History:   Diagnosis Date    Personal history of other specified conditions     History of multiple pulmonary nodules       Past Surgical History:   Procedure Laterality Date    APPENDECTOMY  03/21/2017    Appendectomy    BACK SURGERY  03/21/2017    Back Surgery    BI STEREOTACTIC GUIDED BREAST LEFT LOCALIZATION AND BIOPSY Left 6/14/2016    BI STEREOTACTIC GUIDED BREAST LOCALIZATION AND BIOPSY LEFT [facility]    HYSTERECTOMY  03/21/2017    Hysterectomy    OTHER SURGICAL HISTORY  03/21/2017    Hand Repair       Medication Documentation Review Audit       Reviewed by Holly Jung PA-C (Physician Assistant) on 08/27/24 at 1011      Medication Order Taking? Sig Documenting Provider Last Dose Status   amLODIPine (Norvasc) 5 mg tablet [id number] Yes Take by mouth once every 24 hours. Historical Provider, MD Taking Active   cholecalciferol (Vitamin D-3) 25 MCG (1000 UT) capsule [id number] Yes Take 1 capsule (25 mcg) by mouth once daily. Historical Provider, MD Taking Active   omeprazole (PriLOSEC) 40 mg DR capsule [id number] Yes TAKE ONE CAPSULE BY MOUTH DAILY IN THE MORNING 30 MINUTES BEFORE BREAKFAST Rajni Perez PA-C Taking Active   omeprazole (PriLOSEC) 40 mg DR capsule [id number] Yes Take 1 capsule (40 mg) by mouth once daily in the morning. Take before meals. Historical Provider, MD Taking Active   pramipexole  "(Mirapex) 0.125 mg tablet [id number] Yes Take 1 tablet (0.125 mg) by mouth once daily at bedtime. Historical Provider, MD Taking Active                    Allergies   Allergen Reactions    Aluminum Aspirin Unknown    Aspirin Unknown and Other     \"had blood clot at 16 and told not to take\"    Codeine Unknown    Lisinopril Cough and Hives    Nsaids (Non-Steroidal Anti-Inflammatory Drug) Other     Stage 3 kidney disease       Review of Systems   Constitutional:  Negative for chills and fever.   HENT:  Negative for nosebleeds, sinus pressure and trouble swallowing.    Eyes:  Negative for discharge.   Respiratory:  Negative for shortness of breath and wheezing.    Cardiovascular:  Negative for chest pain.   Musculoskeletal:  Positive for arthralgias and joint swelling.   Skin:  Negative for color change and rash.   All other systems reviewed and are negative.    Exam   On exam patient is alert, awake, and in no acute distress.  Head is normocephalic, no JVD, no auditory wheezes.  She has mild pain with full active forward flexion, good cuff strength with resisted external rotation but positive impingement signs on the right.  Deltoid is functional, no severe biceps tenderness today.  Decent elbow, wrist, and digital motion with mild DJD in the hands she has active triggering of the right ring finger.  Tender over the right ring A1 pulley tendon sheath.  Decent digital motion otherwise.  Skin is warm and dry, without ulcerations, no other swelling lymphadenopathy.  Good pulses and sensation in the upper extremity    Assessment   Encounter Diagnosis   Name Primary?    Right shoulder pain, unspecified chronicity    Right shoulder impingement  Right ring trigger finger    Plan   We discussed conservative treatment today for the shoulder and hand.  She is having classic impingement symptoms and right ring trigger finger.  We sterilely injected under ultrasound guidance Kenalog and lidocaine into the right shoulder subacromial " space and Depo-Medrol lidocaine into the right ring finger A1 pulley tendon sheath.  Patient understands the small risk of infection and the signs to look for as well as for reaction.  Hopefully these give her good relief.  She can follow-up with us as needed.    L Inj/Asp: R subacromial bursa on 8/26/2024 2:41 PM  Indications: pain  Details: 22 G needle, ultrasound-guided  Medications: 3 mL lidocaine 10 mg/mL (1 %); 30 mg triamcinolone acetonide 10 mg/mL  Outcome: tolerated well, no immediate complications    After discussing the risks and benefits of the procedure with proceeded with an injection.  Using ultrasound guidance we identified the acromion, humeral head and the subacromial bursa, images obtained. We then sterilely injected the right subacrominal space with a mixture of 30 mg of Kenalog and 2 cc of 1 % lidocaine. Pt tolerated the procedure well without any adverse reactions.   Procedure, treatment alternatives, risks and benefits explained, specific risks discussed. Consent was given by the patient. Immediately prior to procedure a time out was called to verify the correct patient, procedure, equipment, support staff and site/side marked as required. Patient was prepped and draped in the usual sterile fashion.       Hand / UE Inj/Asp: R ring A1 for trigger finger on 8/27/2024 10:22 AM  Indications: pain  Details: 25 G needle, ultrasound-guided  Medications: 1 mL lidocaine 10 mg/mL (1 %); 20 mg methylPREDNISolone acetate 40 mg/mL  Outcome: tolerated well, no immediate complications    After discussing the risks and benefits of the procedure we proceeded with the injection. Under ultrasound guidance we identified the metacarpal bone and overlying tendon sheath with the FDS and FDP tendons, images obtained. We then sterilely injected the right ring finger A1 pulley with a mixture of 20 mg of DepoMedrol and .5 cc of 1% lidocaine. Pt tolerated the procedure well without any adverse reactions.    Procedure,  treatment alternatives, risks and benefits explained, specific risks discussed. Consent was given by the patient. Immediately prior to procedure a time out was called to verify the correct patient, procedure, equipment, support staff and site/side marked as required. Patient was prepped and draped in the usual sterile fashion.       Written by Holly Farfan saw, evaluated, and treated the patient with the PA

## 2024-08-29 ENCOUNTER — TREATMENT (OUTPATIENT)
Dept: PHYSICAL THERAPY | Facility: CLINIC | Age: 69
End: 2024-08-29
Payer: MEDICARE

## 2024-08-29 DIAGNOSIS — G89.29 OTHER CHRONIC PAIN: ICD-10-CM

## 2024-08-29 DIAGNOSIS — M54.41 LUMBAGO WITH SCIATICA, RIGHT SIDE: ICD-10-CM

## 2024-08-29 PROCEDURE — 97014 ELECTRIC STIMULATION THERAPY: CPT | Mod: GP

## 2024-08-29 PROCEDURE — 97140 MANUAL THERAPY 1/> REGIONS: CPT | Mod: GP

## 2024-08-29 PROCEDURE — 97110 THERAPEUTIC EXERCISES: CPT | Mod: GP

## 2024-08-29 ASSESSMENT — PAIN - FUNCTIONAL ASSESSMENT: PAIN_FUNCTIONAL_ASSESSMENT: 0-10

## 2024-08-29 ASSESSMENT — PAIN SCALES - GENERAL: PAINLEVEL_OUTOF10: 5 - MODERATE PAIN

## 2024-08-29 NOTE — PROGRESS NOTES
"Physical Therapy Treatment  Patient Name: Arabella Hanks  MRN: [id number]     Time Calculation  Start Time: 1415  Stop Time: 1510  Time Calculation (min): 55 min  PT Modalities Time Entry  E-Stim (Unattended) Time Entry: 15  PT Therapeutic Procedures Time Entry  Manual Therapy Time Entry: 20  Therapeutic Exercise Time Entry: 15  Visit Number: 19/24  Visits/Dates Authorized: [place]    Current Problem:   1. Lumbago with sciatica, right side  Follow Up In Physical Therapy      2. Other chronic pain  Follow Up In Physical Therapy          Precautions: L/R TKR       Subjective   General:  Pt states her back pain is 5/10 today. Had R shoulder injection on Tuesday.     Pre-Treatment Symptoms:   Pain Assessment: 0-10  0-10 (Numeric) Pain Score: 5 - Moderate pain    Objective   Findings:   Tenderness R/L lumbar paraspinals, L/R quadratus lumborum tenderness    Midthoracic paraspinal hypertonicity at T5-T8 L/R    Low Back Disability Questionnaire: 7/29/24: 34% impaired; 5/6/24: 28% impaired (IE 36% impaired)    Strength: L/R hip ext/abd/lumbar ext 4/5, other LE grossly 5/5    R Shoulder flexion/ER/abduction WFL but painful with active motion  R Shoulder resisted ER/flex 4/5, abd 4-/5-painful    5/10      Treatments:      Therapeutic Exercise:   Pulleys x 2 min flex  Wall slides x 15  Shuttle press dbl # x 15, x15, x10  L/R 75# 2x10  Banded hip hinge black 2x12  Monster walks orange 15 feet x 4  Tloop side step ornage 15 feet x 4  FT chops 17.5# x 10 L/R    DNP  Over the counter hip ext L/R x 12  Rev hyper 2x 5  Leg curl 55# 2x10  LTR x 10 L/R  PPT x 15  FT trunk rot 5# 2x10  Tloop row red 2x12  Tloop shld ER grn 2x12  Tloop shld IR grn 2x12  Tloop shld ext red 2x12  Shuttle press dbl # x15, x12, x10  Prone hip ext x10 L/R  Prone back ext 2x10  Seated hip IR 2x12 orange  Seated lumbar flexion AROM x 10  Seated lumbar ext iso 10\" x 5  Tloop side step orange 15 feet x 4  Bent over row 12# x 10  Bicep curl 12# x 15  FT " single arm high row 10# x12 L/R  Tball heel slides x 15  Tball mod bridge x 3 painful  LTR x 10  Open book L/R x 10  Seated Tball lumbar flexion 2x15  Hooklying hip abd light with bridge 2x10 , no pain  FT row 10# 2x15  FT chops 15# x 10 L/R  Tloop side step orange 15 feet L/R x 6  FT facepull 2.5# x 10, 5# x 10  Front raise + Tloop shld abd iso pink x 3#  Single arm row L/R 9# 2x10  Tball bridge x 10  Bridge x 10  Prone hip ext 2x12 L/R  Prone back ext 2x8  Sidelying shoulder abd + ER x10  Quadruped weight shift fwd/back x 10  Sit to stand 2x10  FT shld ext 5# 2x10  Side step Tloop orange 15 feet x 2 L/R     Neuromuscular Re-Ed:   Therapeutic Activity:   Gait Training:     Manual Therapy:     Effleurage to L/R thoracic and lumbar paraspinals and thoracolumbar fascia. Manual trigger point release L gluteus medius/quadratus lumborum/piriformis.       Modalities:       Dry needling following informed consent: with e-stim; 4Hz, mA to tolerance, applied to R/L lumbar paraspinals and L/ R gluteus medius, for 10 minutes.  Pt lying prone      Assessment:    Pt's back pain was her average today. 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 Not doing her exercises at home but tries to walk outside when she can.     Post-Treatment Symptoms:     Plan:    Continue PT for 6 additional PT visits.     Goals:       1. Patient to demonstrate the use of proper body mechanics and posture when performing ADL's and picking up objects > 10 lbs to eliminate/reduce their symptoms for self care independence.-met    2. Patient to sleep uninterrupted for 6 hours without being awakened by their pain to allow them to perform ADL's and work effectively during the day. -progressing    3. Patient to be able to load and unload the  without increased pain in their low back and demonstrate proper body mechanics.-met    4. Decrease patient's subjective low back pain to 3/10.-progressing    5. Patient will be able to stand > 30 minutes without  increased pain to allow patient to prepare meals for self care independence.-progressing    6. Patient to rotate trunk to the left to look over shoulder when driving to safely switch lanes and back up an automobile.-met    7. Patient to be able to load and unload her  without increased pain in their low back while demonstrating proper body mechanics for self care independence.-met    8. Patient will improve their Low Back Disability Score to < 20% to allow patient to carry groceries into home without symptom exacerbation.  -progressing

## 2024-09-05 ENCOUNTER — TREATMENT (OUTPATIENT)
Dept: PHYSICAL THERAPY | Facility: CLINIC | Age: 69
End: 2024-09-05
Payer: MEDICARE

## 2024-09-05 DIAGNOSIS — M54.41 LUMBAGO WITH SCIATICA, RIGHT SIDE: ICD-10-CM

## 2024-09-05 DIAGNOSIS — G89.29 OTHER CHRONIC PAIN: ICD-10-CM

## 2024-09-05 PROCEDURE — 97014 ELECTRIC STIMULATION THERAPY: CPT | Mod: GP

## 2024-09-05 PROCEDURE — 97140 MANUAL THERAPY 1/> REGIONS: CPT | Mod: GP

## 2024-09-05 PROCEDURE — 97110 THERAPEUTIC EXERCISES: CPT | Mod: GP

## 2024-09-05 ASSESSMENT — PAIN SCALES - GENERAL: PAINLEVEL_OUTOF10: 8

## 2024-09-05 ASSESSMENT — PAIN - FUNCTIONAL ASSESSMENT: PAIN_FUNCTIONAL_ASSESSMENT: 0-10

## 2024-09-05 NOTE — PROGRESS NOTES
"Physical Therapy Treatment  Patient Name: Arabella Hanks  MRN: [id number]     Time Calculation  Start Time: 1415  Stop Time: 1505  Time Calculation (min): 50 min  PT Modalities Time Entry  E-Stim (Unattended) Time Entry: 15  PT Therapeutic Procedures Time Entry  Manual Therapy Time Entry: 20  Therapeutic Exercise Time Entry: 10  Visit Number: 19/24  Visits/Dates Authorized: [place]    Current Problem:   1. Lumbago with sciatica, right side  Follow Up In Physical Therapy      2. Other chronic pain  Follow Up In Physical Therapy          Precautions: L/R TKR       Subjective   General:  Pt states her back is having muscle spasms today. Hard to turn in bed.     Pre-Treatment Symptoms:   Pain Assessment: 0-10  0-10 (Numeric) Pain Score: 8    Objective   Findings:   Tenderness R/L lumbar paraspinals, L/R quadratus lumborum tenderness    Midthoracic paraspinal hypertonicity at T5-T8 L/R    Low Back Disability Questionnaire: 7/29/24: 34% impaired; 5/6/24: 28% impaired (IE 36% impaired)    Strength: L/R hip ext/abd/lumbar ext 4/5, other LE grossly 5/5    R Shoulder flexion/ER/abduction WFL but painful with active motion  R Shoulder resisted ER/flex 4/5, abd 4-/5-painful      Treatments:      Therapeutic Exercise:   Nustep lv 5 x 5min  LTR  x10  Tball heel slides x 20  Open book x 5  Hooklying hip abd iso 30\" x 5    Deferred:  Pulleys x 2 min flex  Wall slides x 15  Shuttle press dbl # x 15, x15, x10  L/R 75# 2x10  Banded hip hinge black 2x12  Monster walks orange 15 feet x 4  Tloop side step ornage 15 feet x 4  FT chops 17.5# x 10 L/R  Over the counter hip ext L/R x 12  Rev hyper 2x 5  Leg curl 55# 2x10  LTR x 10 L/R  PPT x 15  FT trunk rot 5# 2x10  Tloop row red 2x12  Tloop shld ER grn 2x12  Tloop shld IR grn 2x12  Tloop shld ext red 2x12  Shuttle press dbl # x15, x12, x10  Prone hip ext x10 L/R  Prone back ext 2x10  Seated hip IR 2x12 orange  Seated lumbar flexion AROM x 10  Seated lumbar ext iso 10\" x 5  Tloop " side step orange 15 feet x 4  Bent over row 12# x 10  Bicep curl 12# x 15  FT single arm high row 10# x12 L/R  Tball heel slides x 15  Tball mod bridge x 3 painful  LTR x 10  Open book L/R x 10  Seated Tball lumbar flexion 2x15  Hooklying hip abd light with bridge 2x10 , no pain  FT row 10# 2x15  FT chops 15# x 10 L/R  Tloop side step orange 15 feet L/R x 6  FT facepull 2.5# x 10, 5# x 10  Front raise + Tloop shld abd iso pink x 3#  Single arm row L/R 9# 2x10  Tball bridge x 10  Bridge x 10  Prone hip ext 2x12 L/R  Prone back ext 2x8  Sidelying shoulder abd + ER x10  Quadruped weight shift fwd/back x 10  Sit to stand 2x10  FT shld ext 5# 2x10  Side step Tloop orange 15 feet x 2 L/R     Neuromuscular Re-Ed:   Therapeutic Activity:   Gait Training:     Manual Therapy:   Thermal wand (hot):    Thermal wand and Effleurage to L/R thoracic and lumbar paraspinals and thoracolumbar fascia. Manual trigger point release L middle thoracic paraspinals.     Modalities:     Dry needling following informed consent: with e-stim; 4Hz, mA to tolerance, applied to R/L thoracic and lumbar multifudus/paraspinals, for 10 minutes.  Pt lying prone.      Assessment:    Pt having muscle spasms today during PT in her middle and upper L thoracic paraspinals. Pt struggled with exercises due to pain. Session focused on manual therapy including soft tissue mobilization to reduce pain and muscle spasms.     Post-Treatment Symptoms:     Plan:    Continue PT for 6 additional PT visits.     Goals:       1. Patient to demonstrate the use of proper body mechanics and posture when performing ADL's and picking up objects > 10 lbs to eliminate/reduce their symptoms for self care independence.-met    2. Patient to sleep uninterrupted for 6 hours without being awakened by their pain to allow them to perform ADL's and work effectively during the day. -progressing    3. Patient to be able to load and unload the  without increased pain in their low  back and demonstrate proper body mechanics.-met    4. Decrease patient's subjective low back pain to 3/10.-progressing    5. Patient will be able to stand > 30 minutes without increased pain to allow patient to prepare meals for self care independence.-progressing    6. Patient to rotate trunk to the left to look over shoulder when driving to safely switch lanes and back up an automobile.-met    7. Patient to be able to load and unload her  without increased pain in their low back while demonstrating proper body mechanics for self care independence.-met    8. Patient will improve their Low Back Disability Score to < 20% to allow patient to carry groceries into home without symptom exacerbation.  -progressing

## 2024-09-09 ENCOUNTER — TREATMENT (OUTPATIENT)
Dept: PHYSICAL THERAPY | Facility: CLINIC | Age: 69
End: 2024-09-09
Payer: MEDICARE

## 2024-09-09 DIAGNOSIS — M54.41 LUMBAGO WITH SCIATICA, RIGHT SIDE: ICD-10-CM

## 2024-09-09 DIAGNOSIS — G89.29 OTHER CHRONIC PAIN: ICD-10-CM

## 2024-09-09 PROCEDURE — 97140 MANUAL THERAPY 1/> REGIONS: CPT | Mod: GP

## 2024-09-09 PROCEDURE — 97110 THERAPEUTIC EXERCISES: CPT | Mod: GP

## 2024-09-09 ASSESSMENT — PAIN - FUNCTIONAL ASSESSMENT: PAIN_FUNCTIONAL_ASSESSMENT: 0-10

## 2024-09-09 ASSESSMENT — PAIN SCALES - GENERAL: PAINLEVEL_OUTOF10: 6

## 2024-09-09 NOTE — PROGRESS NOTES
"Physical Therapy Treatment  Patient Name: Arabella Hanks  MRN: [id number]     Time Calculation  Start Time: 1445  Stop Time: 1540  Time Calculation (min): 55 min  PT Therapeutic Procedures Time Entry  Manual Therapy Time Entry: 7  Therapeutic Exercise Time Entry: 32  Visit Number: 20/24  Visits/Dates Authorized: [place]    Current Problem:   1. Lumbago with sciatica, right side  Follow Up In Physical Therapy      2. Other chronic pain  Follow Up In Physical Therapy          Precautions: L/R TKR       Subjective   General:  Pt states her back spasms are better. Still slower and painful getting in/out of bed.     Pre-Treatment Symptoms:   Pain Assessment: 0-10  0-10 (Numeric) Pain Score: 6    Objective   Findings:   Tenderness R/L lumbar paraspinals, L/R quadratus lumborum tenderness    Midthoracic paraspinal hypertonicity at T5-T8 L/R    Low Back Disability Questionnaire: 7/29/24: 34% impaired; 5/6/24: 28% impaired (IE 36% impaired)    Strength: L/R hip ext/abd/lumbar ext 4/5, other LE grossly 5/5    R Shoulder flexion/ER/abduction WFL but painful with active motion  R Shoulder resisted ER/flex 4/5, abd 4-/5-painful      Treatments:      Therapeutic Exercise:   Nustep lv 5 x 5min  LTR  x10  Tball heel slides x 20  Open book 2x5  Hooklying hip abd light 2x15 L/R  Shuttle press dbl # x 15, x15, x10  L/R 75# 2x10  FT chops 17.5# x 10 L/R  Leg curl 55# 2x10  Seated Tball lumbar flexion 2x15  Tball bridge mod 2x5  Hooklying hip abd w/ bridge x10     Deferred:  Pulleys x 2 min flex  Wall slides x 15  Banded hip hinge black 2x12  Monster walks orange 15 feet x 4  Tloop side step orange 15 feet x 4  Over the counter hip ext L/R x 12  Rev hyper 2x 5  LTR x 10 L/R  PPT x 15  FT trunk rot 5# 2x10  Tloop row red 2x12  Tloop shld ER grn 2x12  Tloop shld IR grn 2x12  Tloop shld ext red 2x12  Prone hip ext x10 L/R  Prone back ext 2x10  Seated hip IR 2x12 orange  Seated lumbar flexion AROM x 10  Seated lumbar ext iso 10\" x " 5  Tloop side step orange 15 feet x 4  Bent over row 12# x 10  Bicep curl 12# x 15  FT single arm high row 10# x12 L/R  Tball heel slides x 15  Tball mod bridge x 3 painful  Hooklying hip abd light with bridge 2x10 , no pain  FT row 10# 2x15  FT chops 15# x 10 L/R  Tloop side step orange 15 feet L/R x 6  FT facepull 2.5# x 10, 5# x 10  Front raise + Tloop shld abd iso pink x 3#  Single arm row L/R 9# 2x10  Tball bridge x 10  Bridge x 10  Prone hip ext 2x12 L/R  Prone back ext 2x8  Sidelying shoulder abd + ER x10  Quadruped weight shift fwd/back x 10  Sit to stand 2x10  FT shld ext 5# 2x10  Side step Tloop orange 15 feet x 2 L/R     Neuromuscular Re-Ed:   Therapeutic Activity:   Gait Training:     Manual Therapy:   Effleurage to L/R lumbar paraspinals, thoracolumbar fascia.     Modalities:     Dry needling following informed consent: with e-stim; 4Hz, mA to tolerance, applied to R/L thoracic and lumbar multifudus/paraspinals, for 10 minutes.  Pt lying prone.      Assessment:    Pt's muscle spasms have reduced however she was still having some pain with lumbar rotation and bed mobility. Pt worked on lumbar ROM and hip/core/back strengthening to relieve pain.     Post-Treatment Symptoms:     Plan:    Continue PT for 6 additional PT visits.     Goals:       1. Patient to demonstrate the use of proper body mechanics and posture when performing ADL's and picking up objects > 10 lbs to eliminate/reduce their symptoms for self care independence.-met    2. Patient to sleep uninterrupted for 6 hours without being awakened by their pain to allow them to perform ADL's and work effectively during the day. -progressing    3. Patient to be able to load and unload the  without increased pain in their low back and demonstrate proper body mechanics.-met    4. Decrease patient's subjective low back pain to 3/10.-progressing    5. Patient will be able to stand > 30 minutes without increased pain to allow patient to prepare  meals for self care independence.-progressing    6. Patient to rotate trunk to the left to look over shoulder when driving to safely switch lanes and back up an automobile.-met    7. Patient to be able to load and unload her  without increased pain in their low back while demonstrating proper body mechanics for self care independence.-met    8. Patient will improve their Low Back Disability Score to < 20% to allow patient to carry groceries into home without symptom exacerbation.  -progressing

## 2024-09-12 ENCOUNTER — APPOINTMENT (OUTPATIENT)
Dept: PHYSICAL THERAPY | Facility: CLINIC | Age: 69
End: 2024-09-12
Payer: MEDICARE

## 2024-09-16 ENCOUNTER — TREATMENT (OUTPATIENT)
Dept: PHYSICAL THERAPY | Facility: CLINIC | Age: 69
End: 2024-09-16
Payer: MEDICARE

## 2024-09-16 DIAGNOSIS — G89.29 OTHER CHRONIC PAIN: ICD-10-CM

## 2024-09-16 DIAGNOSIS — M54.41 LUMBAGO WITH SCIATICA, RIGHT SIDE: ICD-10-CM

## 2024-09-16 PROCEDURE — 97140 MANUAL THERAPY 1/> REGIONS: CPT | Mod: GP

## 2024-09-16 PROCEDURE — 97110 THERAPEUTIC EXERCISES: CPT | Mod: GP

## 2024-09-16 ASSESSMENT — PAIN - FUNCTIONAL ASSESSMENT: PAIN_FUNCTIONAL_ASSESSMENT: 0-10

## 2024-09-16 ASSESSMENT — PAIN SCALES - GENERAL: PAINLEVEL_OUTOF10: 5 - MODERATE PAIN

## 2024-09-16 NOTE — PROGRESS NOTES
"Physical Therapy Treatment  Patient Name: Arabella Hanks  MRN: [id number]        Visit Number: 20/24  Visits/Dates Authorized: [place]    Current Problem:   1. Lumbago with sciatica, right side  Follow Up In Physical Therapy      2. Other chronic pain  Follow Up In Physical Therapy          Precautions: L/R TKR       Subjective   General:  Pt states her back is sore today. Better then last week.     Pre-Treatment Symptoms:   Pain Assessment: 0-10  0-10 (Numeric) Pain Score: 5 - Moderate pain    Objective   Findings:   Tenderness R/L lumbar paraspinals, L/R quadratus lumborum tenderness    Midthoracic paraspinal hypertonicity at T5-T8 L/R    Low Back Disability Questionnaire: 7/29/24: 34% impaired; 5/6/24: 28% impaired (IE 36% impaired)    Strength: L/R hip ext/abd/lumbar ext 4/5, other LE grossly 5/5    R Shoulder flexion/ER/abduction WFL but painful with active motion  R Shoulder resisted ER/flex 4/5, abd 4-/5-painful      Treatments:      Therapeutic Exercise:   Tball heel slides x 15  LTR  x10  Tball heel slides x 20  Open book 2x5  Mod side plank 2x5 L/R  Hooklying hip abd light  w/ bridge 2x5  FT lat pull down15# 2x 15 L/R  Leg curl 55# 2x10  Seated Tball lumbar flexion 2x15  Monster walks grn 15 feet x 4  Tloop side step grn 15 feet x 4  Over the counter hip ext L/R 2x 12  Deadlift KB off step 26# 2x10      Deferred:  Shuttle press dbl # x 15, x15, x10  L/R 75# 2x10  Pulleys x 2 min flex  Wall slides x 15  Banded hip hinge black 2x12  Rev hyper 2x 5  LTR x 10 L/R  PPT x 15  FT trunk rot 5# 2x10  Tloop row red 2x12  Tloop shld ER grn 2x12  Tloop shld IR grn 2x12  Tloop shld ext red 2x12  Prone hip ext x10 L/R  Prone back ext 2x10  Seated hip IR 2x12 orange  Seated lumbar flexion AROM x 10  Seated lumbar ext iso 10\" x 5  Tloop side step orange 15 feet x 4  Bent over row 12# x 10  Bicep curl 12# x 15  FT single arm high row 10# x12 L/R  Tball heel slides x 15  Tball mod bridge x 3 painful  Hooklying hip abd " light with bridge 2x10 , no pain  FT row 10# 2x15  FT chops 15# x 10 L/R  Tloop side step orange 15 feet L/R x 6  FT facepull 2.5# x 10, 5# x 10  Front raise + Tloop shld abd iso pink x 3#  Single arm row L/R 9# 2x10  Tball bridge x 10  Bridge x 10  Prone hip ext 2x12 L/R  Prone back ext 2x8  Sidelying shoulder abd + ER x10  Quadruped weight shift fwd/back x 10  Sit to stand 2x10  FT shld ext 5# 2x10  Side step Tloop orange 15 feet x 2 L/R     Neuromuscular Re-Ed:   Therapeutic Activity:   Gait Training:     Manual Therapy:   Effleurage to L/R lumbar paraspinals, thoracolumbar fascia.     Modalities:     Dry needling following informed consent: with e-stim; 4Hz, mA to tolerance, applied to R/L thoracic and lumbar multifudus/paraspinals, for 10 minutes.  Pt lying prone.      Assessment:    Pt hasn't had a back spasm in a week. Still having some back pain but bed mobility at home is less painful. Pt continues to work on posterior chain strengthening and lumbar ROM to reduce pain.     Post-Treatment Symptoms:     Plan:    Continue PT for 6 additional PT visits.     Goals:       1. Patient to demonstrate the use of proper body mechanics and posture when performing ADL's and picking up objects > 10 lbs to eliminate/reduce their symptoms for self care independence.-met    2. Patient to sleep uninterrupted for 6 hours without being awakened by their pain to allow them to perform ADL's and work effectively during the day. -progressing    3. Patient to be able to load and unload the  without increased pain in their low back and demonstrate proper body mechanics.-met    4. Decrease patient's subjective low back pain to 3/10.-progressing    5. Patient will be able to stand > 30 minutes without increased pain to allow patient to prepare meals for self care independence.-progressing    6. Patient to rotate trunk to the left to look over shoulder when driving to safely switch lanes and back up an automobile.-met    7.  Patient to be able to load and unload her  without increased pain in their low back while demonstrating proper body mechanics for self care independence.-met    8. Patient will improve their Low Back Disability Score to < 20% to allow patient to carry groceries into home without symptom exacerbation.  -progressing

## 2024-09-26 ENCOUNTER — LAB (OUTPATIENT)
Dept: LAB | Facility: LAB | Age: 69
End: 2024-09-26
Payer: MEDICARE

## 2024-09-26 ENCOUNTER — HOSPITAL ENCOUNTER (OUTPATIENT)
Dept: RADIOLOGY | Facility: CLINIC | Age: 69
Discharge: HOME | End: 2024-09-26
Payer: MEDICARE

## 2024-09-26 DIAGNOSIS — Z11.59 ENCOUNTER FOR SCREENING FOR OTHER VIRAL DISEASES: Primary | ICD-10-CM

## 2024-09-26 DIAGNOSIS — Z13.220 ENCOUNTER FOR SCREENING FOR LIPOID DISORDERS: ICD-10-CM

## 2024-09-26 DIAGNOSIS — G25.81 RESTLESS LEGS SYNDROME: ICD-10-CM

## 2024-09-26 DIAGNOSIS — R25.2 CRAMP AND SPASM: ICD-10-CM

## 2024-09-26 DIAGNOSIS — J40 BRONCHITIS, NOT SPECIFIED AS ACUTE OR CHRONIC: ICD-10-CM

## 2024-09-26 LAB — MAGNESIUM SERPL-MCNC: 2.11 MG/DL (ref 1.6–2.4)

## 2024-09-26 PROCEDURE — 36415 COLL VENOUS BLD VENIPUNCTURE: CPT

## 2024-09-26 PROCEDURE — 83735 ASSAY OF MAGNESIUM: CPT

## 2024-09-26 PROCEDURE — 86803 HEPATITIS C AB TEST: CPT

## 2024-09-26 PROCEDURE — 71046 X-RAY EXAM CHEST 2 VIEWS: CPT

## 2024-09-27 LAB — HCV AB SER QL: NONREACTIVE

## 2024-09-30 ENCOUNTER — APPOINTMENT (OUTPATIENT)
Dept: PHYSICAL THERAPY | Facility: CLINIC | Age: 69
End: 2024-09-30
Payer: MEDICARE

## 2024-09-30 DIAGNOSIS — G89.29 OTHER CHRONIC PAIN: ICD-10-CM

## 2024-09-30 DIAGNOSIS — M54.41 LUMBAGO WITH SCIATICA, RIGHT SIDE: ICD-10-CM

## 2024-09-30 PROCEDURE — 97110 THERAPEUTIC EXERCISES: CPT | Mod: GP,KX

## 2024-09-30 PROCEDURE — 97140 MANUAL THERAPY 1/> REGIONS: CPT | Mod: GP,KX

## 2024-09-30 NOTE — LETTER
September 30, 2024    Cheryl Fletcher DO  [address] [facility] [address]    Patient: Arabella Hanks   YOB: 1955   Date of Visit: 9/30/2024       Dear Cheryl Fletcher DO  [address] [place] [address]    The attached plan of care is being sent to you because your patient’s medical reimbursement requires that you certify the plan of care. Your signature is required to allow uninterrupted insurance coverage.      You may indicate your approval by signing below and faxing this form back to us at Dept Fax: [phone].    Please call Dept: [phone] with any questions or concerns.    Thank you for this referral,        Paul Montoya PT  [facility] PHYSICIAN [place]  [facility] PHYSICIAN [place]  [address] [facility] [id number] [phone]    Payer: Payor: MEDICAL [facility] MEDICARE / Plan: [facility] MEDICARE / Product Type: *No Product type* /                                                                         Date:     Dear Paul Montoya PT,     Re: Ms. Arabella Hanks, MRN:[id number]    I certify that I have reviewed the attached plan of care and it is medically necessary for Ms. Arabella Hanks (1955) who is under my care.          ______________________________________                    _________________  Provider name and credentials                                           Date and time                                                                                           Plan of Care 9/28/24   Effective from: 9/28/2024  Effective to: 11/14/2024    Plan ID: [id number]                Participants as of Finalize on 9/30/2024      Name Type Comments Contact Info    Cheryl Fletcher DO Primary Care Provider  [phone]    Paul Montoya PT Physical Therapist  [phone]           Last Plan Note       Author: Paul Montoya PT Status: Sign when Signing Visit Last edited: 9/30/2024 12:45 PM         Physical Therapy  "Treatment/Progress Note  Patient Name: Arabella Hanks  MRN: [id number]     Time Calculation  Start Time: 1245  Stop Time: 1341  Time Calculation (min): 56 min  PT Modalities Time Entry  E-Stim (Unattended) Time Entry: 12  PT Therapeutic Procedures Time Entry  Manual Therapy Time Entry: 6  Therapeutic Exercise Time Entry: 33  Visit Number: 20/24  Visits/Dates Authorized: MN    Current Problem:   1. Lumbago with sciatica, right side  Follow Up In Physical Therapy    Follow Up In Physical Therapy      2. Other chronic pain  Follow Up In Physical Therapy    Follow Up In Physical Therapy          Precautions: L/R TKR       Subjective   General:  Pt states her back is pretty sore today. Shoulder pain is much improved. Still having back spasms.     Pre-Treatment Symptoms:        Objective   Findings:   Tenderness R/L lumbar paraspinals, L/R quadratus lumborum tenderness    Midthoracic paraspinal hypertonicity at T5-T8 L/R    Low Back Disability Questionnaire: 9/30/24: 40% impaired 7/29/24: 34% impaired; 5/6/24: 28% impaired (IE 36% impaired)    Strength: L/R hip ext/abd/lumbar ext 4/5, other LE grossly 5/5    R Shoulder flexion/ER/abduction WFL   R Shoulder resisted ER/flex 4/5, abd 4/5      Treatments:      Therapeutic Exercise:   PPT 2x15  LTR  x10  Prone press up x10  Midback to press up x 10  Hip hinge banded around waist purple 2x10  Tloop shld ext 2x10 red  Unilateral high row 10# 2x12 L/R  Mod side plank x 5 L/R  Seated Tball lumbar flexion x 15  Open book 2x5  Seated Tball lumbar ext iso 10\" x 5      Deferred:  Hooklying hip abd light  w/ bridge 2x5  FT lat pull down15# 2x 15 L/R  Leg curl 55# 2x10  Seated Tball lumbar flexion 2x15  Monster walks grn 15 feet x 4  Tloop side step grn 15 feet x 4  Over the counter hip ext L/R 2x 12  Deadlift KB off step 26# 2x10  Shuttle press dbl # x 15, x15, x10  L/R 75# 2x10  Pulleys x 2 min flex  Wall slides x 15  Banded hip hinge black 2x12  Rev hyper 2x 5  LTR x 10 " "L/R  PPT x 15  FT trunk rot 5# 2x10  Tloop row red 2x12  Tloop shld ER grn 2x12  Tloop shld IR grn 2x12  Tloop shld ext red 2x12  Prone hip ext x10 L/R  Prone back ext 2x10  Seated hip IR 2x12 orange  Seated lumbar flexion AROM x 10  Seated lumbar ext iso 10\" x 5  Tloop side step orange 15 feet x 4  Bent over row 12# x 10  Bicep curl 12# x 15  FT single arm high row 10# x12 L/R  Tball heel slides x 15  Tball mod bridge x 3 painful  Hooklying hip abd light with bridge 2x10 , no pain  FT row 10# 2x15  FT chops 15# x 10 L/R  Tloop side step orange 15 feet L/R x 6  FT facepull 2.5# x 10, 5# x 10  Front raise + Tloop shld abd iso pink x 3#  Single arm row L/R 9# 2x10  Tball bridge x 10  Bridge x 10  Prone hip ext 2x12 L/R  Prone back ext 2x8  Sidelying shoulder abd + ER x10  Quadruped weight shift fwd/back x 10  Sit to stand 2x10  FT shld ext 5# 2x10  Side step Tloop orange 15 feet x 2 L/R     Neuromuscular Re-Ed:   Therapeutic Activity:   Gait Training:     Manual Therapy:   Effleurage to L/R erector spinae muscles in thoracic and lumbar spine.     Modalities:     Dry needling following informed consent: with e-stim; 4Hz, mA to tolerance, applied to R/L thoracic and lumbar multifudus/paraspinals, for 10 minutes.  Pt lying prone.      Assessment:    Pt still having back spasms and pain with turning over in bed. Pain has fluctuated over the past few weeks but recently worsened and flared up after cleaning a house at work. Pt would benefit from 6 additional PT visits to reduce pain and get over this recent back pain flare up.     Post-Treatment Symptoms:     Plan:    Continue PT for 6 additional PT visits.     Goals:       1. Patient to demonstrate the use of proper body mechanics and posture when performing ADL's and picking up objects > 10 lbs to eliminate/reduce their symptoms for self care independence.-met    2. Patient to sleep uninterrupted for 6 hours without being awakened by their pain to allow them to perform " ADL's and work effectively during the day. -progressing    3. Patient to be able to load and unload the  without increased pain in their low back and demonstrate proper body mechanics.-met    4. Decrease patient's subjective low back pain to 3/10.-progressing    5. Patient will be able to stand > 30 minutes without increased pain to allow patient to prepare meals for self care independence.-progressing    6. Patient to rotate trunk to the left to look over shoulder when driving to safely switch lanes and back up an automobile.-progressing    7. Patient to be able to load and unload her  without increased pain in their low back while demonstrating proper body mechanics for self care independence.-progressing    8. Patient will improve their Low Back Disability Score to < 20% to allow patient to carry groceries into home without symptom exacerbation.  -progressing             Current Participants as of 9/30/2024      Name Type Comments Contact Info    Cheryl Fletcher DO Primary Care Provider  [phone]    Signature pending    Paul Montoya PT Physical Therapist  [phone]    Electronically signed by Paul Montoya PT at 9/30/2024 5780 EDT

## 2024-09-30 NOTE — LETTER
September 30, 2024    Cheryl Fletcher DO  [address] [facility] [address]    Patient: Arabella Hanks   YOB: 1955   Date of Visit: 9/30/2024       Dear Cheryl Fletcher DO  [address] [place] [address]    The attached plan of care is being sent to you because your patient’s medical reimbursement requires that you certify the plan of care. Your signature is required to allow uninterrupted insurance coverage.      You may indicate your approval by signing below and faxing this form back to us at Dept Fax: [phone].    Please call Dept: [phone] with any questions or concerns.    Thank you for this referral,        Paul Montoya PT  [facility] PHYSICIAN [place]  [facility] PHYSICIAN [place]ERTO  [address] [facility] [id number] [phone]    Payer: Payor: MEDICAL [facility] MEDICARE / Plan: [facility] MEDICARE / Product Type: *No Product type* /                                                                         Date:     Dear Paul Montoya PT,     Re: Ms. Arabella Hanks, MRN:[id number]    I certify that I have reviewed the attached plan of care and it is medically necessary for Ms. Arabella Hanks (1955) who is under my care.          ______________________________________                    _________________  Provider name and credentials                                           Date and time                                                                                           Plan of Care 9/28/24   Effective from: 9/28/2024  Effective to: 11/14/2024    Plan ID: [id number]            Participants as of Finalize on 9/30/2024    Name Type Comments Contact Info    Cheryl Fletcher DO Primary Care Provider  [phone]    Paul Montoya PT Physical Therapist  [phone]       Last Plan Note     Author: Paul Montoya PT Status: Sign when Signing Visit Last edited: 9/30/2024 12:45 PM       Physical Therapy Treatment/Progress  "Note  Patient Name: Arabella Hanks  MRN: [id number]     Time Calculation  Start Time: 1245  Stop Time: 1341  Time Calculation (min): 56 min  PT Modalities Time Entry  E-Stim (Unattended) Time Entry: 12  PT Therapeutic Procedures Time Entry  Manual Therapy Time Entry: 6  Therapeutic Exercise Time Entry: 33  Visit Number: 20/24  Visits/Dates Authorized: MN    Current Problem:   1. Lumbago with sciatica, right side  Follow Up In Physical Therapy    Follow Up In Physical Therapy      2. Other chronic pain  Follow Up In Physical Therapy    Follow Up In Physical Therapy          Precautions: L/R TKR       Subjective   General:  Pt states her back is pretty sore today. Shoulder pain is much improved. Still having back spasms.     Pre-Treatment Symptoms:        Objective   Findings:   Tenderness R/L lumbar paraspinals, L/R quadratus lumborum tenderness    Midthoracic paraspinal hypertonicity at T5-T8 L/R    Low Back Disability Questionnaire: 9/30/24: 40% impaired 7/29/24: 34% impaired; 5/6/24: 28% impaired (IE 36% impaired)    Strength: L/R hip ext/abd/lumbar ext 4/5, other LE grossly 5/5    R Shoulder flexion/ER/abduction WFL   R Shoulder resisted ER/flex 4/5, abd 4/5      Treatments:      Therapeutic Exercise:   PPT 2x15  LTR  x10  Prone press up x10  Midback to press up x 10  Hip hinge banded around waist purple 2x10  Tloop shld ext 2x10 red  Unilateral high row 10# 2x12 L/R  Mod side plank x 5 L/R  Seated Tball lumbar flexion x 15  Open book 2x5  Seated Tball lumbar ext iso 10\" x 5      Deferred:  Hooklying hip abd light  w/ bridge 2x5  FT lat pull down15# 2x 15 L/R  Leg curl 55# 2x10  Seated Tball lumbar flexion 2x15  Monster walks grn 15 feet x 4  Tloop side step grn 15 feet x 4  Over the counter hip ext L/R 2x 12  Deadlift KB off step 26# 2x10  Shuttle press dbl # x 15, x15, x10  L/R 75# 2x10  Pulleys x 2 min flex  Wall slides x 15  Banded hip hinge black 2x12  Rev hyper 2x 5  LTR x 10 L/R  PPT x 15  FT trunk " "rot 5# 2x10  Tloop row red 2x12  Tloop shld ER grn 2x12  Tloop shld IR grn 2x12  Tloop shld ext red 2x12  Prone hip ext x10 L/R  Prone back ext 2x10  Seated hip IR 2x12 orange  Seated lumbar flexion AROM x 10  Seated lumbar ext iso 10\" x 5  Tloop side step orange 15 feet x 4  Bent over row 12# x 10  Bicep curl 12# x 15  FT single arm high row 10# x12 L/R  Tball heel slides x 15  Tball mod bridge x 3 painful  Hooklying hip abd light with bridge 2x10 , no pain  FT row 10# 2x15  FT chops 15# x 10 L/R  Tloop side step orange 15 feet L/R x 6  FT facepull 2.5# x 10, 5# x 10  Front raise + Tloop shld abd iso pink x 3#  Single arm row L/R 9# 2x10  Tball bridge x 10  Bridge x 10  Prone hip ext 2x12 L/R  Prone back ext 2x8  Sidelying shoulder abd + ER x10  Quadruped weight shift fwd/back x 10  Sit to stand 2x10  FT shld ext 5# 2x10  Side step Tloop orange 15 feet x 2 L/R     Neuromuscular Re-Ed:   Therapeutic Activity:   Gait Training:     Manual Therapy:   Effleurage to L/R erector spinae muscles in thoracic and lumbar spine.     Modalities:     Dry needling following informed consent: with e-stim; 4Hz, mA to tolerance, applied to R/L thoracic and lumbar multifudus/paraspinals, for 10 minutes.  Pt lying prone.      Assessment:    Pt still having back spasms and pain with turning over in bed. Pain has fluctuated over the past few weeks but recently worsened and flared up after cleaning a house at work. Pt would benefit from 6 additional PT visits to reduce pain and get over this recent back pain flare up.     Post-Treatment Symptoms:     Plan:    Continue PT for 6 additional PT visits.     Goals:       1. Patient to demonstrate the use of proper body mechanics and posture when performing ADL's and picking up objects > 10 lbs to eliminate/reduce their symptoms for self care independence.-met    2. Patient to sleep uninterrupted for 6 hours without being awakened by their pain to allow them to perform ADL's and work effectively " during the day. -progressing    3. Patient to be able to load and unload the  without increased pain in their low back and demonstrate proper body mechanics.-met    4. Decrease patient's subjective low back pain to 3/10.-progressing    5. Patient will be able to stand > 30 minutes without increased pain to allow patient to prepare meals for self care independence.-progressing    6. Patient to rotate trunk to the left to look over shoulder when driving to safely switch lanes and back up an automobile.-progressing    7. Patient to be able to load and unload her  without increased pain in their low back while demonstrating proper body mechanics for self care independence.-progressing    8. Patient will improve their Low Back Disability Score to < 20% to allow patient to carry groceries into home without symptom exacerbation.  -progressing             Current Participants as of 9/30/2024    Name Type Comments Contact Info    Cheryl Fletcher DO Primary Care Provider  [phone]    Signature pending    Paul Montoya PT Physical Therapist  [phone]    Signature pending

## 2024-09-30 NOTE — PROGRESS NOTES
"Physical Therapy Treatment/Progress Note  Patient Name: Arabella Hanks  MRN: [id number]     Time Calculation  Start Time: 1245  Stop Time: 1341  Time Calculation (min): 56 min  PT Modalities Time Entry  E-Stim (Unattended) Time Entry: 12  PT Therapeutic Procedures Time Entry  Manual Therapy Time Entry: 6  Therapeutic Exercise Time Entry: 33  Visit Number: 20/24  Visits/Dates Authorized: [place]    Current Problem:   1. Lumbago with sciatica, right side  Follow Up In Physical Therapy    Follow Up In Physical Therapy      2. Other chronic pain  Follow Up In Physical Therapy    Follow Up In Physical Therapy          Precautions: L/R TKR       Subjective   General:  Pt states her back is pretty sore today. Shoulder pain is much improved. Still having back spasms.     Pre-Treatment Symptoms:        Objective   Findings:   Tenderness R/L lumbar paraspinals, L/R quadratus lumborum tenderness    Midthoracic paraspinal hypertonicity at T5-T8 L/R    Low Back Disability Questionnaire: 9/30/24: 40% impaired 7/29/24: 34% impaired; 5/6/24: 28% impaired (IE 36% impaired)    Strength: L/R hip ext/abd/lumbar ext 4/5, other LE grossly 5/5    R Shoulder flexion/ER/abduction WFL   R Shoulder resisted ER/flex 4/5, abd 4/5      Treatments:      Therapeutic Exercise:   PPT 2x15  LTR  x10  Prone press up x10  Midback to press up x 10  Hip hinge banded around waist purple 2x10  Tloop shld ext 2x10 red  Unilateral high row 10# 2x12 L/R  Mod side plank x 5 L/R  Seated Tball lumbar flexion x 15  Open book 2x5  Seated Tball lumbar ext iso 10\" x 5      Deferred:  Hooklying hip abd light  w/ bridge 2x5  FT lat pull down15# 2x 15 L/R  Leg curl 55# 2x10  Seated Tball lumbar flexion 2x15  Monster walks grn 15 feet x 4  Tloop side step grn 15 feet x 4  Over the counter hip ext L/R 2x 12  Deadlift KB off step 26# 2x10  Shuttle press dbl # x 15, x15, x10  L/R 75# 2x10  Pulleys x 2 min flex  Wall slides x 15  Banded hip hinge black 2x12  Rev hyper 2x " "5  LTR x 10 L/R  PPT x 15  FT trunk rot 5# 2x10  Tloop row red 2x12  Tloop shld ER grn 2x12  Tloop shld IR grn 2x12  Tloop shld ext red 2x12  Prone hip ext x10 L/R  Prone back ext 2x10  Seated hip IR 2x12 orange  Seated lumbar flexion AROM x 10  Seated lumbar ext iso 10\" x 5  Tloop side step orange 15 feet x 4  Bent over row 12# x 10  Bicep curl 12# x 15  FT single arm high row 10# x12 L/R  Tball heel slides x 15  Tball mod bridge x 3 painful  Hooklying hip abd light with bridge 2x10 , no pain  FT row 10# 2x15  FT chops 15# x 10 L/R  Tloop side step orange 15 feet L/R x 6  FT facepull 2.5# x 10, 5# x 10  Front raise + Tloop shld abd iso pink x 3#  Single arm row L/R 9# 2x10  Tball bridge x 10  Bridge x 10  Prone hip ext 2x12 L/R  Prone back ext 2x8  Sidelying shoulder abd + ER x10  Quadruped weight shift fwd/back x 10  Sit to stand 2x10  FT shld ext 5# 2x10  Side step Tloop orange 15 feet x 2 L/R     Neuromuscular Re-Ed:   Therapeutic Activity:   Gait Training:     Manual Therapy:   Effleurage to L/R erector spinae muscles in thoracic and lumbar spine.     Modalities:     Dry needling following informed consent: with e-stim; 4Hz, mA to tolerance, applied to R/L thoracic and lumbar multifudus/paraspinals, for 10 minutes.  Pt lying prone.      Assessment:    Pt still having back spasms and pain with turning over in bed. Pain has fluctuated over the past few weeks but recently worsened and flared up after cleaning a house at work. Pt would benefit from 6 additional PT visits to reduce pain and get over this recent back pain flare up.     Post-Treatment Symptoms:     Plan:    Continue PT for 6 additional PT visits.     Goals:       1. Patient to demonstrate the use of proper body mechanics and posture when performing ADL's and picking up objects > 10 lbs to eliminate/reduce their symptoms for self care independence.-met    2. Patient to sleep uninterrupted for 6 hours without being awakened by their pain to allow them " to perform ADL's and work effectively during the day. -progressing    3. Patient to be able to load and unload the  without increased pain in their low back and demonstrate proper body mechanics.-met    4. Decrease patient's subjective low back pain to 3/10.-progressing    5. Patient will be able to stand > 30 minutes without increased pain to allow patient to prepare meals for self care independence.-progressing    6. Patient to rotate trunk to the left to look over shoulder when driving to safely switch lanes and back up an automobile.-progressing    7. Patient to be able to load and unload her  without increased pain in their low back while demonstrating proper body mechanics for self care independence.-progressing    8. Patient will improve their Low Back Disability Score to < 20% to allow patient to carry groceries into home without symptom exacerbation.  -progressing

## 2024-10-03 ENCOUNTER — LAB (OUTPATIENT)
Dept: LAB | Facility: LAB | Age: 69
End: 2024-10-03
Payer: MEDICARE

## 2024-10-03 DIAGNOSIS — Z13.220 ENCOUNTER FOR SCREENING FOR LIPOID DISORDERS: ICD-10-CM

## 2024-10-03 LAB
CHOLEST SERPL-MCNC: 223 MG/DL (ref 0–199)
CHOLESTEROL/HDL RATIO: 3.9
HDLC SERPL-MCNC: 56.5 MG/DL
LDLC SERPL CALC-MCNC: 144 MG/DL
NON HDL CHOLESTEROL: 167 MG/DL (ref 0–149)
TRIGL SERPL-MCNC: 112 MG/DL (ref 0–149)
VLDL: 22 MG/DL (ref 0–40)

## 2024-10-03 PROCEDURE — 36415 COLL VENOUS BLD VENIPUNCTURE: CPT

## 2024-10-07 ENCOUNTER — HOSPITAL ENCOUNTER (OUTPATIENT)
Dept: RADIOLOGY | Facility: HOSPITAL | Age: 69
Discharge: HOME | End: 2024-10-07
Payer: MEDICARE

## 2024-10-07 DIAGNOSIS — Z78.0 ASYMPTOMATIC MENOPAUSAL STATE: ICD-10-CM

## 2024-10-07 DIAGNOSIS — Z13.820 ENCOUNTER FOR SCREENING FOR OSTEOPOROSIS: ICD-10-CM

## 2024-10-07 PROCEDURE — 77080 DXA BONE DENSITY AXIAL: CPT

## 2024-10-28 ENCOUNTER — LAB (OUTPATIENT)
Dept: LAB | Facility: LAB | Age: 69
End: 2024-10-28
Payer: MEDICARE

## 2024-10-28 ENCOUNTER — TREATMENT (OUTPATIENT)
Dept: PHYSICAL THERAPY | Facility: CLINIC | Age: 69
End: 2024-10-28
Payer: MEDICARE

## 2024-10-28 DIAGNOSIS — G89.29 OTHER CHRONIC PAIN: ICD-10-CM

## 2024-10-28 DIAGNOSIS — M54.41 LUMBAGO WITH SCIATICA, RIGHT SIDE: ICD-10-CM

## 2024-10-28 DIAGNOSIS — R60.0 LOCALIZED EDEMA: Primary | ICD-10-CM

## 2024-10-28 LAB
ALBUMIN SERPL BCP-MCNC: 3.9 G/DL (ref 3.4–5)
ALP SERPL-CCNC: 75 U/L (ref 33–136)
ALT SERPL W P-5'-P-CCNC: 13 U/L (ref 7–45)
ANION GAP SERPL CALC-SCNC: 13 MMOL/L (ref 10–20)
AST SERPL W P-5'-P-CCNC: 19 U/L (ref 9–39)
BASOPHILS # BLD AUTO: 0.06 X10*3/UL (ref 0–0.1)
BASOPHILS NFR BLD AUTO: 0.9 %
BILIRUB SERPL-MCNC: 0.6 MG/DL (ref 0–1.2)
BUN SERPL-MCNC: 20 MG/DL (ref 6–23)
CALCIUM SERPL-MCNC: 8.9 MG/DL (ref 8.6–10.3)
CHLORIDE SERPL-SCNC: 105 MMOL/L (ref 98–107)
CO2 SERPL-SCNC: 28 MMOL/L (ref 21–32)
CREAT SERPL-MCNC: 1.04 MG/DL (ref 0.5–1.05)
EGFRCR SERPLBLD CKD-EPI 2021: 58 ML/MIN/1.73M*2
EOSINOPHIL # BLD AUTO: 0.13 X10*3/UL (ref 0–0.4)
EOSINOPHIL NFR BLD AUTO: 2 %
ERYTHROCYTE [DISTWIDTH] IN BLOOD BY AUTOMATED COUNT: 13.4 % (ref 11.5–14.5)
GLUCOSE SERPL-MCNC: 103 MG/DL (ref 74–99)
HCT VFR BLD AUTO: 40 % (ref 36–46)
HGB BLD-MCNC: 12.6 G/DL (ref 12–16)
IMM GRANULOCYTES # BLD AUTO: 0.01 X10*3/UL (ref 0–0.5)
IMM GRANULOCYTES NFR BLD AUTO: 0.2 % (ref 0–0.9)
LYMPHOCYTES # BLD AUTO: 1.28 X10*3/UL (ref 0.8–3)
LYMPHOCYTES NFR BLD AUTO: 20 %
MCH RBC QN AUTO: 29.4 PG (ref 26–34)
MCHC RBC AUTO-ENTMCNC: 31.5 G/DL (ref 32–36)
MCV RBC AUTO: 94 FL (ref 80–100)
MONOCYTES # BLD AUTO: 0.42 X10*3/UL (ref 0.05–0.8)
MONOCYTES NFR BLD AUTO: 6.6 %
NEUTROPHILS # BLD AUTO: 4.51 X10*3/UL (ref 1.6–5.5)
NEUTROPHILS NFR BLD AUTO: 70.3 %
NRBC BLD-RTO: 0 /100 WBCS (ref 0–0)
PLATELET # BLD AUTO: 287 X10*3/UL (ref 150–450)
POTASSIUM SERPL-SCNC: 3.9 MMOL/L (ref 3.5–5.3)
PROT SERPL-MCNC: 6.2 G/DL (ref 6.4–8.2)
RBC # BLD AUTO: 4.28 X10*6/UL (ref 4–5.2)
SODIUM SERPL-SCNC: 142 MMOL/L (ref 136–145)
WBC # BLD AUTO: 6.4 X10*3/UL (ref 4.4–11.3)

## 2024-10-28 PROCEDURE — 97140 MANUAL THERAPY 1/> REGIONS: CPT | Mod: GP

## 2024-10-28 PROCEDURE — 80053 COMPREHEN METABOLIC PANEL: CPT

## 2024-10-28 PROCEDURE — 85025 COMPLETE CBC W/AUTO DIFF WBC: CPT

## 2024-10-28 PROCEDURE — 97110 THERAPEUTIC EXERCISES: CPT | Mod: GP

## 2024-10-28 PROCEDURE — 36415 COLL VENOUS BLD VENIPUNCTURE: CPT

## 2024-10-28 ASSESSMENT — PAIN - FUNCTIONAL ASSESSMENT: PAIN_FUNCTIONAL_ASSESSMENT: 0-10

## 2024-10-28 ASSESSMENT — PAIN SCALES - GENERAL: PAINLEVEL_OUTOF10: 8

## 2024-10-31 ENCOUNTER — LAB (OUTPATIENT)
Dept: LAB | Facility: LAB | Age: 69
End: 2024-10-31
Payer: MEDICARE

## 2024-10-31 DIAGNOSIS — N18.30 CHRONIC KIDNEY DISEASE, STAGE 3 UNSPECIFIED (MULTI): Primary | ICD-10-CM

## 2024-10-31 LAB — PTH-INTACT SERPL-MCNC: 74.9 PG/ML (ref 18.5–88)

## 2024-10-31 PROCEDURE — 83970 ASSAY OF PARATHORMONE: CPT

## 2024-10-31 PROCEDURE — 36415 COLL VENOUS BLD VENIPUNCTURE: CPT

## 2024-11-04 ENCOUNTER — TREATMENT (OUTPATIENT)
Dept: PHYSICAL THERAPY | Facility: CLINIC | Age: 69
End: 2024-11-04
Payer: MEDICARE

## 2024-11-04 DIAGNOSIS — G89.29 OTHER CHRONIC PAIN: ICD-10-CM

## 2024-11-04 DIAGNOSIS — M54.41 LUMBAGO WITH SCIATICA, RIGHT SIDE: ICD-10-CM

## 2024-11-04 PROCEDURE — 97110 THERAPEUTIC EXERCISES: CPT | Mod: GP,KX

## 2024-11-04 PROCEDURE — 97140 MANUAL THERAPY 1/> REGIONS: CPT | Mod: GP,KX

## 2024-11-04 ASSESSMENT — PAIN SCALES - GENERAL: PAINLEVEL_OUTOF10: 5 - MODERATE PAIN

## 2024-11-04 ASSESSMENT — PAIN - FUNCTIONAL ASSESSMENT: PAIN_FUNCTIONAL_ASSESSMENT: 0-10

## 2024-11-04 NOTE — PROGRESS NOTES
Physical Therapy Treatment  Patient Name: Arabella Hanks  MRN: [id number]     Time Calculation  Start Time: 1330  Stop Time: 1430  Time Calculation (min): 60 min  PT Modalities Time Entry  E-Stim (Unattended) Time Entry: 10  PT Therapeutic Procedures Time Entry  Manual Therapy Time Entry: 10  Therapeutic Exercise Time Entry: 31  Visit Number: 22/24  Visits/Dates Authorized: [place]    Current Problem:   1. Lumbago with sciatica, right side  Follow Up In Physical Therapy      2. Other chronic pain  Follow Up In Physical Therapy          Precautions: L/R TKR       Subjective   General:  Pt states she had her vein procedure last week. Her leg swelling has gone down. Neck and back are sore today.     Pre-Treatment Symptoms:   Pain Assessment: 0-10  0-10 (Numeric) Pain Score: 5 - Moderate pain    Objective   Findings:   Tenderness R/L lumbar paraspinals, L/R quadratus lumborum tenderness    Midthoracic paraspinal hypertonicity at T5-T8 L/R    Low Back Disability Questionnaire: 9/30/24: 40% impaired 7/29/24: 34% impaired; 5/6/24: 28% impaired (IE 36% impaired)    Strength: L/R hip ext/abd/lumbar ext 4/5, other LE grossly 5/5    R Shoulder flexion/ER/abduction WFL   R Shoulder resisted ER/flex 4/5, abd 4/5      Treatments:      Therapeutic Exercise:   Nustep lv 5 x 4min  Tball heel slides 2x10  Tball bridge 2x10  LTR 2  x10  Mod side plank 2x 6 L/R  Cat/cow x 10  Quadruped hip ext 2x10 L/R  Quadruped neck retraction x 10  Neck rotation AROM L/R x 15  Neck Sidebend AROM 2x15  Neck rolling x 10  Neck rotation SNAG w/ towel 2x10 L/R    Deferred:  FT lat pull down15# 2x 15 L/R  Leg curl 55# 2x10  Seated Tball lumbar flexion 2x15  Monster walks grn 15 feet x 4  Tloop side step grn 15 feet x 4  Over the counter hip ext L/R 2x 12  Deadlift KB off step 26# 2x10  Shuttle press dbl # x 15, x15, x10  L/R 75# 2x10  Pulleys x 2 min flex  Wall slides x 15  Banded hip hinge black 2x12  Rev hyper 2x 5  LTR x 10 L/R  PPT x 15  FT trunk  "rot 5# 2x10  Tloop row red 2x12  Tloop shld ER grn 2x12  Tloop shld IR grn 2x12  Tloop shld ext red 2x12  Prone hip ext x10 L/R  Prone back ext 2x10  Seated hip IR 2x12 orange  Seated lumbar flexion AROM x 10  Seated lumbar ext iso 10\" x 5  Tloop side step orange 15 feet x 4  Bent over row 12# x 10  Bicep curl 12# x 15  FT single arm high row 10# x12 L/R  Tball heel slides x 15  Tball mod bridge x 3 painful  Hooklying hip abd light with bridge 2x10 , no pain  FT row 10# 2x15  FT chops 15# x 10 L/R  Tloop side step orange 15 feet L/R x 6  FT facepull 2.5# x 10, 5# x 10  Front raise + Tloop shld abd iso pink x 3#  Single arm row L/R 9# 2x10  Tball bridge x 10  Bridge x 10  Prone hip ext 2x12 L/R  Prone back ext 2x8  Sidelying shoulder abd + ER x10  Quadruped weight shift fwd/back x 10  Sit to stand 2x10  FT shld ext 5# 2x10  Side step Tloop orange 15 feet x 2 L/R     Neuromuscular Re-Ed:   Therapeutic Activity:   Gait Training:     Manual Therapy:   Effleurage to L/R erector spinae muscles in cervical/thoracic and lumbar spine.     Modalities:   Dry needling following informed consent: with e-stim; 4Hz, mA to tolerance, applied to R/L cervical and lumbar multifudus/paraspinals, for 10 minutes.  Pt lying prone.      Assessment:    Pt c/o 5/10 low back pain this visit. Neck also stiff and sore. Pt reported pain relief after manual therapy and exercise. Pt worked on gentle neck and lumbar ROM/strengthening.     Post-Treatment Symptoms:     Plan:    Continue PT for 6 additional PT visits.     Goals:       1. Patient to demonstrate the use of proper body mechanics and posture when performing ADL's and picking up objects > 10 lbs to eliminate/reduce their symptoms for self care independence.-met    2. Patient to sleep uninterrupted for 6 hours without being awakened by their pain to allow them to perform ADL's and work effectively during the day. -progressing    3. Patient to be able to load and unload the  " without increased pain in their low back and demonstrate proper body mechanics.-met    4. Decrease patient's subjective low back pain to 3/10.-progressing    5. Patient will be able to stand > 30 minutes without increased pain to allow patient to prepare meals for self care independence.-progressing    6. Patient to rotate trunk to the left to look over shoulder when driving to safely switch lanes and back up an automobile.-progressing    7. Patient to be able to load and unload her  without increased pain in their low back while demonstrating proper body mechanics for self care independence.-progressing    8. Patient will improve their Low Back Disability Score to < 20% to allow patient to carry groceries into home without symptom exacerbation.  -progressing

## 2024-11-05 ENCOUNTER — OFFICE VISIT (OUTPATIENT)
Dept: ORTHOPEDIC SURGERY | Facility: CLINIC | Age: 69
End: 2024-11-05
Payer: MEDICARE

## 2024-11-05 DIAGNOSIS — M65.341 TRIGGER FINGER, RIGHT RING FINGER: ICD-10-CM

## 2024-11-05 DIAGNOSIS — M75.41 SHOULDER IMPINGEMENT SYNDROME, RIGHT: Primary | ICD-10-CM

## 2024-11-05 PROCEDURE — 20550 NJX 1 TENDON SHEATH/LIGAMENT: CPT | Mod: RT | Performed by: ORTHOPAEDIC SURGERY

## 2024-11-05 PROCEDURE — 1160F RVW MEDS BY RX/DR IN RCRD: CPT | Performed by: ORTHOPAEDIC SURGERY

## 2024-11-05 PROCEDURE — 1159F MED LIST DOCD IN RCRD: CPT | Performed by: ORTHOPAEDIC SURGERY

## 2024-11-05 PROCEDURE — 2500000004 HC RX 250 GENERAL PHARMACY W/ HCPCS (ALT 636 FOR OP/ED): Performed by: ORTHOPAEDIC SURGERY

## 2024-11-05 PROCEDURE — 1036F TOBACCO NON-USER: CPT | Performed by: ORTHOPAEDIC SURGERY

## 2024-11-05 PROCEDURE — 99213 OFFICE O/P EST LOW 20 MIN: CPT | Performed by: ORTHOPAEDIC SURGERY

## 2024-11-05 PROCEDURE — 20611 DRAIN/INJ JOINT/BURSA W/US: CPT | Mod: RT | Performed by: ORTHOPAEDIC SURGERY

## 2024-11-05 PROCEDURE — 76942 ECHO GUIDE FOR BIOPSY: CPT | Performed by: ORTHOPAEDIC SURGERY

## 2024-11-05 ASSESSMENT — PAIN SCALES - GENERAL: PAINLEVEL_OUTOF10: 5 - MODERATE PAIN

## 2024-11-05 ASSESSMENT — ENCOUNTER SYMPTOMS
ARTHRALGIAS: 1
CHILLS: 0
BRUISES/BLEEDS EASILY: 0
FATIGUE: 0
WHEEZING: 0
SHORTNESS OF BREATH: 0
FEVER: 0

## 2024-11-05 NOTE — PROGRESS NOTES
Reason for Appointment  Chief Complaint   Patient presents with    Right Thumb - Pain    Left Shoulder - Pain    Right Shoulder - Pain     History of Present Illness  Patient is a 69 y.o. female here today for follow-up evaluation of right thumb and shoulder and left shoulder pain. We last saw her on 8/26/24 for her right shoulder and right hand and we gave her a right subacromial and right ring injection. Today she reports the injections gave her good relief and is requesting repeat injections. She had no problems with past injections. Her pain has returned. No recent falls or injuries. No other changes in past medical history, allergies, or medications.      Past Medical History:   Diagnosis Date    Personal history of other specified conditions     History of multiple pulmonary nodules       Past Surgical History:   Procedure Laterality Date    APPENDECTOMY  03/21/2017    Appendectomy    BACK SURGERY  03/21/2017    Back Surgery    BI STEREOTACTIC GUIDED BREAST LEFT LOCALIZATION AND BIOPSY Left 6/14/2016    BI STEREOTACTIC GUIDED BREAST LOCALIZATION AND BIOPSY LEFT [facility]    HYSTERECTOMY  03/21/2017    Hysterectomy    OTHER SURGICAL HISTORY  03/21/2017    Hand Repair       Medication Documentation Review Audit       Reviewed by Ashley Gamboa MA (Medical Assistant) on 11/05/24 at 1539      Medication Order Taking? Sig Documenting Provider Last Dose Status   amLODIPine (Norvasc) 5 mg tablet [id number] Yes Take by mouth once every 24 hours. Historical Provider, MD Taking Active   cholecalciferol (Vitamin D-3) 25 MCG (1000 UT) capsule [id number] Yes Take 1 capsule (25 mcg) by mouth once daily. Historical Provider, MD Taking Active   omeprazole (PriLOSEC) 40 mg DR capsule [id number] Yes TAKE ONE CAPSULE BY MOUTH DAILY IN THE MORNING 30 MINUTES BEFORE BREAKFAST Rajni Perez PA-C Taking Active   omeprazole (PriLOSEC) 40 mg DR capsule [id number] Yes Take 1 capsule (40 mg) by mouth once daily in the  "morning. Take before meals. Historical Provider, MD Taking Active   pramipexole (Mirapex) 0.125 mg tablet [id number] Yes Take 1 tablet (0.125 mg) by mouth once daily at bedtime. Historical Provider, MD Taking Active                    Allergies   Allergen Reactions    Aluminum Aspirin Unknown    Aspirin Unknown and Other     \"had blood clot at 16 and told not to take\"    Codeine Unknown    Lisinopril Cough and Hives    Nsaids (Non-Steroidal Anti-Inflammatory Drug) Other     Stage 3 kidney disease       Review of Systems   Constitutional:  Negative for chills, fatigue and fever.   Respiratory:  Negative for shortness of breath and wheezing.    Cardiovascular:  Negative for chest pain and leg swelling.   Musculoskeletal:  Positive for arthralgias.   Allergic/Immunologic: Negative for immunocompromised state.   Hematological:  Does not bruise/bleed easily.       Exam   On exam of the right ring finger there is tenderness over the A1 pulley with palpable triggering,, some mild arthritic changes seen throughout the hand but good range of motion otherwise the right shoulder shows again a markedly positive impingement sign with elevation to 130 good deltoid function mild weakness in the cuff no effusion good pulses good sensation otherwise    Assessment   Right shoulder impingement  Right ring trigger finger    Plan     We discussed conservative treatment today for the shoulder and hand.  She is having classic impingement symptoms and right ring trigger finger.  We sterilely injected under ultrasound guidance Kenalog and lidocaine into the right shoulder subacromial space and Depo-Medrol lidocaine into the right ring finger A1 pulley tendon sheath.  Patient understands the small risk of infection and the signs to look for as well as for reaction.  Hopefully these give her good relief. She can follow-up with us as needed.     We did discuss the risk of repeated injections and we talked about most likely surgery first on the " finger and shoulder surgery as a last resort    Patient ID: Arabella Hanks is a 69 y.o. female.    L Inj/Asp: R subacromial bursa on 11/5/2024 3:46 PM  Indications: pain  Details: 22 G needle, ultrasound-guided  Medications: 40 mg triamcinolone acetonide 40 mg/mL; 3 mL lidocaine 10 mg/mL (1 %)  Outcome: tolerated well, no immediate complications    After discussing the risks and benefits of the procedure with proceeded with an injection.  Using ultrasound guidance we identified the acromion, humeral head and the subacromial bursa, images obtained. We then sterilely injected the right subacrominal space with a mixture of 40 mg of Kenalog and 1 cc of 1 % lidocaine. Pt tolerated the procedure well without any adverse reactions.    Procedure, treatment alternatives, risks and benefits explained, specific risks discussed. Consent was given by the patient. Immediately prior to procedure a time out was called to verify the correct patient, procedure, equipment, support staff and site/side marked as required. Patient was prepped and draped in the usual sterile fashion.       Hand / UE Inj/Asp: R ring A1 for trigger finger on 11/5/2024 3:50 PM  Indications: pain  Details: 25 G needle, ultrasound-guided  Medications: 1 mL lidocaine 10 mg/mL (1 %); 20 mg methylPREDNISolone acetate 40 mg/mL  Outcome: tolerated well, no immediate complications    After discussing the risks and benefits of the procedure we proceeded with the injection. Under ultrasound guidance we identified the metacarpal bone and overlying tendon sheath with the FDS and FDP tendons, images obtained. We then sterilely injected the right ring finger A1 pulley with a mixture of 20 mg of DepoMedrol and .5 cc of 1% lidocaine. Pt tolerated the procedure well without any adverse reactions.    Procedure, treatment alternatives, risks and benefits explained, specific risks discussed. Consent was given by the patient. Immediately prior to procedure a time out was  called to verify the correct patient, procedure, equipment, support staff and site/side marked as required. Patient was prepped and draped in the usual sterile fashion.           I, Lanie Green, attest that this documentation has been prepared under the direction and in the presence of Fantasma Farfan MD.   By signing below, I, Fantasma Farfan MD, personally performed the services described in this documentation. All medical record entries made by the scribe were at my direction and in my presence. I have reviewed the chart and agree that the record reflects my personal performance and is accurate and complete.

## 2024-11-06 RX ORDER — METHYLPREDNISOLONE ACETATE 40 MG/ML
20 INJECTION, SUSPENSION INTRA-ARTICULAR; INTRALESIONAL; INTRAMUSCULAR; SOFT TISSUE
Status: COMPLETED | OUTPATIENT
Start: 2024-11-05 | End: 2024-11-05

## 2024-11-06 RX ORDER — LIDOCAINE HYDROCHLORIDE 10 MG/ML
3 INJECTION, SOLUTION INFILTRATION; PERINEURAL
Status: COMPLETED | OUTPATIENT
Start: 2024-11-05 | End: 2024-11-05

## 2024-11-06 RX ORDER — TRIAMCINOLONE ACETONIDE 40 MG/ML
40 INJECTION, SUSPENSION INTRA-ARTICULAR; INTRAMUSCULAR
Status: COMPLETED | OUTPATIENT
Start: 2024-11-05 | End: 2024-11-05

## 2024-11-06 RX ORDER — LIDOCAINE HYDROCHLORIDE 10 MG/ML
1 INJECTION, SOLUTION INFILTRATION; PERINEURAL
Status: COMPLETED | OUTPATIENT
Start: 2024-11-05 | End: 2024-11-05

## 2024-11-11 ENCOUNTER — TREATMENT (OUTPATIENT)
Dept: PHYSICAL THERAPY | Facility: CLINIC | Age: 69
End: 2024-11-11
Payer: MEDICARE

## 2024-11-11 DIAGNOSIS — M54.41 LUMBAGO WITH SCIATICA, RIGHT SIDE: ICD-10-CM

## 2024-11-11 DIAGNOSIS — G89.29 OTHER CHRONIC PAIN: ICD-10-CM

## 2024-11-11 PROCEDURE — 97140 MANUAL THERAPY 1/> REGIONS: CPT | Mod: GP

## 2024-11-11 PROCEDURE — 97110 THERAPEUTIC EXERCISES: CPT | Mod: GP

## 2024-11-11 ASSESSMENT — PAIN SCALES - GENERAL: PAINLEVEL_OUTOF10: 3

## 2024-11-11 ASSESSMENT — PAIN - FUNCTIONAL ASSESSMENT: PAIN_FUNCTIONAL_ASSESSMENT: 0-10

## 2024-11-11 NOTE — PROGRESS NOTES
"Physical Therapy Treatment  Patient Name: Arabella Hanks  MRN: [id number]     Time Calculation  Start Time: 1300  Stop Time: 1400  Time Calculation (min): 60 min  PT Modalities Time Entry  E-Stim (Unattended) Time Entry: 10  PT Therapeutic Procedures Time Entry  Manual Therapy Time Entry: 9  Therapeutic Exercise Time Entry: 30  Visit Number: 23/24  Visits/Dates Authorized: MN    Current Problem:   1. Lumbago with sciatica, right side  Follow Up In Physical Therapy      2. Other chronic pain  Follow Up In Physical Therapy          Precautions: L/R TKR       Subjective   General:  Pt states her back feels pretty good today. Only 3/10 currently.      Pre-Treatment Symptoms:   Pain Assessment: 0-10  0-10 (Numeric) Pain Score: 3    Objective   Findings:   Tenderness R/L lumbar paraspinals, L/R quadratus lumborum tenderness    Midthoracic paraspinal hypertonicity at T5-T8 L/R    Low Back Disability Questionnaire: 9/30/24: 40% impaired 7/29/24: 34% impaired; 5/6/24: 28% impaired (IE 36% impaired)    Strength: L/R hip ext/abd/lumbar ext 4/5, other LE grossly 5/5    R Shoulder flexion/ER/abduction WFL   R Shoulder resisted ER/flex 4/5, abd 4/5      Treatments:      Therapeutic Exercise:   Nustep lv 5 x 4min  Tball bridge 2x10  Mod side plank x 8 L/R  Prone hip ext x10 L/R  Leg curl 55# 2x10  Deadlift KB off step 26# 2x10  Shuttle press dbl # x 15, x15, x10  L/R 75# 2x10  Rev hypers 2x5      Deferred:  FT lat pull down15# 2x 15 L/R  Seated Tball lumbar flexion 2x15  Monster walks grn 15 feet x 4  Tloop side step grn 15 feet x 4  Over the counter hip ext L/R 2x 12  Pulleys x 2 min flex  Wall slides x 15  Banded hip hinge black 2x12  Rev hyper 2x 5  LTR x 10 L/R  PPT x 15  FT trunk rot 5# 2x10  Tloop row red 2x12  Tloop shld ER grn 2x12  Tloop shld IR grn 2x12  Tloop shld ext red 2x12  Prone hip ext x10 L/R  Prone back ext 2x10  Seated hip IR 2x12 orange  Seated lumbar flexion AROM x 10  Seated lumbar ext iso 10\" x " 5  Tloop side step orange 15 feet x 4  Bent over row 12# x 10  Bicep curl 12# x 15  FT single arm high row 10# x12 L/R  Tball heel slides x 15  Tball mod bridge x 3 painful  Hooklying hip abd light with bridge 2x10 , no pain  FT row 10# 2x15  FT chops 15# x 10 L/R  Tloop side step orange 15 feet L/R x 6  FT facepull 2.5# x 10, 5# x 10  Front raise + Tloop shld abd iso pink x 3#  Single arm row L/R 9# 2x10  Sidelying shoulder abd + ER x10  Quadruped weight shift fwd/back x 10  Sit to stand 2x10  FT shld ext 5# 2x10  Side step Tloop orange 15 feet x 2 L/R     Neuromuscular Re-Ed:   Therapeutic Activity:   Gait Training:     Manual Therapy:   Effleurage to L/R erector spinae muscles in cervical/thoracic and lumbar spine.     Modalities:   Dry needling following informed consent: with e-stim; 4Hz, mA to tolerance, applied to R/L cervical and lumbar multifudus/paraspinals, for 10 minutes.  Pt lying prone.      Assessment:    Pt's low back pain has been better this past week. Pain only 3/10 this visit. 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     Post-Treatment Symptoms:     Plan:    Continue PT for 6 additional PT visits.     Goals:       1. Patient to demonstrate the use of proper body mechanics and posture when performing ADL's and picking up objects > 10 lbs to eliminate/reduce their symptoms for self care independence.-met    2. Patient to sleep uninterrupted for 6 hours without being awakened by their pain to allow them to perform ADL's and work effectively during the day. -progressing    3. Patient to be able to load and unload the  without increased pain in their low back and demonstrate proper body mechanics.-met    4. Decrease patient's subjective low back pain to 3/10.-progressing    5. Patient will be able to stand > 30 minutes without increased pain to allow patient to prepare meals for self care independence.-progressing    6. Patient to rotate  trunk to the left to look over shoulder when driving to safely switch lanes and back up an automobile.-progressing    7. Patient to be able to load and unload her  without increased pain in their low back while demonstrating proper body mechanics for self care independence.-progressing    8. Patient will improve their Low Back Disability Score to < 20% to allow patient to carry groceries into home without symptom exacerbation.  -progressing

## 2024-11-14 ENCOUNTER — TREATMENT (OUTPATIENT)
Dept: PHYSICAL THERAPY | Facility: CLINIC | Age: 69
End: 2024-11-14
Payer: MEDICARE

## 2024-11-14 DIAGNOSIS — G89.29 OTHER CHRONIC PAIN: ICD-10-CM

## 2024-11-14 DIAGNOSIS — M54.41 LUMBAGO WITH SCIATICA, RIGHT SIDE: ICD-10-CM

## 2024-11-14 PROCEDURE — 97140 MANUAL THERAPY 1/> REGIONS: CPT | Mod: GP

## 2024-11-14 PROCEDURE — 97110 THERAPEUTIC EXERCISES: CPT | Mod: GP

## 2024-11-14 ASSESSMENT — PAIN - FUNCTIONAL ASSESSMENT: PAIN_FUNCTIONAL_ASSESSMENT: 0-10

## 2024-11-14 ASSESSMENT — PAIN SCALES - GENERAL: PAINLEVEL_OUTOF10: 6

## 2024-11-14 NOTE — LETTER
November 14, 2024    Cheryl Fletcher DO  [address] [facility] [address]    Patient: Arabella Hanks   YOB: 1955   Date of Visit: 11/14/2024       Dear Cheryl Fletcher DO  [address] [place] [address]    The attached plan of care is being sent to you because your patient’s medical reimbursement requires that you certify the plan of care. Your signature is required to allow uninterrupted insurance coverage.      You may indicate your approval by signing below and faxing this form back to us at Dept Fax: [phone].    Please call Dept: [phone] with any questions or concerns.    Thank you for this referral,        Paul Montoya PT  [facility] PHYSICIAN [place]  [facility] PHYSICIAN [facility]  [address] [facility] [id number] [phone]    Payer: Payor: MEDICAL [facility] MEDICARE / Plan: [facility] MEDICARE / Product Type: *No Product type* /                                                                         Date:     Dear Paul Montoya PT,     Re: Ms. Arabella Hanks, MRN:[id number]    I certify that I have reviewed the attached plan of care and it is medically necessary for Ms. Arabella Hanks (1955) who is under my care.          ______________________________________                    _________________  Provider name and credentials                                           Date and time                                                                                           Plan of Care 11/14/24   Effective from: 11/14/2024  Effective to: 12/24/2024    Plan ID: [id number]            Participants as of Finalize on 11/14/2024    Name Type Comments Contact Info    Cheryl Fletcher DO Primary Care Provider  [phone]    Paul Montoya PT Physical Therapist  [phone]       Last Plan Note     Author: Paul Montoya PT Status: Sign when Signing Visit Last edited: 11/14/2024  4:15 PM       Physical Therapy Treatment/Progress  Note  Patient Name: Arabella Hanks  MRN: [id number]     Time Calculation  Start Time: 1615  Stop Time: 1710  Time Calculation (min): 55 min  PT Modalities Time Entry  E-Stim (Unattended) Time Entry: 12  PT Therapeutic Procedures Time Entry  Manual Therapy Time Entry: 15  Therapeutic Exercise Time Entry: 25  Visit Number: 24/24  Visits/Dates Authorized: MN    Current Problem:   1. Lumbago with sciatica, right side  Follow Up In Physical Therapy      2. 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 Pain has been pretty severe the past few days.       Pre-Treatment Symptoms:   Pain Assessment: 0-10  0-10 (Numeric) Pain Score: 6    Objective   Findings:   Tenderness R/L lumbar paraspinals, L/R quadratus lumborum tenderness    Midthoracic paraspinal hypertonicity at T5-T8 L/R    Low Back Disability Questionnaire: 111/14/24: 46% impairment; 9/30/24: 40% impaired 7/29/24: 34% impaired; 5/6/24: 28% impaired (IE 36% impaired)    Strength: L/R hip ext/abd/lumbar ext 4/5, other LE grossly 5/5    Antalgic gait pattern upon arrival    Bed mobility: rolling L/R, supine to sit-guarded and painful    Treatments:      Therapeutic Exercise:   Nustep lv 5 x 4 min  Tball heel slides 2x10  LTR x 10  PPT 2 x 5(painful)  Seated Tball lumbar flexion 2x15  Sit to stand 2x5  Open book L/R x 10  SKTC L/R 2x5      Deferred:  FT lat pull down15# 2x 15 L/R  Seated Tball lumbar flexion 2x15  Monster walks grn 15 feet x 4  Tloop side step grn 15 feet x 4  Over the counter hip ext L/R 2x 12  Pulleys x 2 min flex  Wall slides x 15  Banded hip hinge black 2x12  Rev hyper 2x 5  LTR x 10 L/R  PPT x 15  FT trunk rot 5# 2x10  Tloop row red 2x12  Tloop shld ER grn 2x12  Tloop shld IR grn 2x12  Tloop shld ext red 2x12  Prone hip ext x10 L/R  Prone back ext 2x10  Seated hip IR  "2x12 orange  Seated lumbar flexion AROM x 10  Seated lumbar ext iso 10\" x 5  Tloop side step orange 15 feet x 4  Bent over row 12# x 10  Bicep curl 12# x 15  FT single arm high row 10# x12 L/R  Tball heel slides x 15  Tball mod bridge x 3 painful  Hooklying hip abd light with bridge 2x10 , no pain  FT row 10# 2x15  FT chops 15# x 10 L/R  Tloop side step orange 15 feet L/R x 6  FT facepull 2.5# x 10, 5# x 10  Front raise + Tloop shld abd iso pink x 3#  Single arm row L/R 9# 2x10  Sidelying shoulder abd + ER x10  Quadruped weight shift fwd/back x 10  Sit to stand 2x10  FT shld ext 5# 2x10  Side step Tloop orange 15 feet x 2 L/R     Neuromuscular Re-Ed:   Therapeutic Activity:   Gait Training:     Manual Therapy:   Effleurage to L/R erector spinae muscles in cervical/thoracic and lumbar spine.   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 Pt reported some slight improvement with pain after treatment but she is still in moderate pain. Pt's Low Back Disability Questionnaire got worse from 40% impairment to 46% impairment after this recent flare up. Pt would benefit from 4 additional PT visits to address this recent low back pain flare up.     Post-Treatment Symptoms:   Response to Interventions: Decrease in pain   Plan:    Continue PT for 4 additional PT visits. 28 total visits.     Goals:     Expected end: 12/24/24    1. Patient to demonstrate the use of proper body mechanics and posture when performing ADL's and picking up objects > 10 lbs to eliminate/reduce their symptoms for self care " independence.-met    2. Patient to sleep uninterrupted for 6 hours without being awakened by their pain to allow them to perform ADL's and work effectively during the day. -progressing    3. Patient to be able to load and unload the  without increased pain in their low back and demonstrate proper body mechanics.-met    4. Decrease patient's subjective low back pain to 3/10.-met    5. Patient will be able to stand > 30 minutes without increased pain to allow patient to prepare meals for self care independence.-not progressing after recently pain flare up. Pt needs to continue to build her lumbar extension strength to allow her to stand prolonged periods without pain to allow her to do dishes, cook meals at home.     6. Patient to rotate trunk to the left to look over shoulder when driving to safely switch lanes and back up an automobile.-met    7. Patient to be able to load and unload her  without increased pain in their low back while demonstrating proper body mechanics for self care independence.-met    8. Patient will improve their Low Back Disability Score to < 20% to allow patient to carry groceries into home without symptom exacerbation.  -not progressing after recent pain flare up. Pt needs to continue to work on improving her low back strength and ROM to allow her to sit/stand > 30 minutes.            Current Participants as of 11/14/2024    Name Type Comments Contact Wei Fletcher DO Primary Care Provider  [phone]    Signature pending    Paul Montoya PT Physical Therapist  [phone]    Electronically signed by Palu Montoya PT at 11/14/2024 1426 EST

## 2024-11-14 NOTE — PROGRESS NOTES
Physical Therapy Treatment/Progress Note  Patient Name: Arabella Hanks  MRN: [id number]     Time Calculation  Start Time: 1615  Stop Time: 1710  Time Calculation (min): 55 min  PT Modalities Time Entry  E-Stim (Unattended) Time Entry: 12  PT Therapeutic Procedures Time Entry  Manual Therapy Time Entry: 15  Therapeutic Exercise Time Entry: 25  Visit Number: 24/24  Visits/Dates Authorized: [place]    Current Problem:   1. Lumbago with sciatica, right side  Follow Up In Physical Therapy      2. 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 Pain has been pretty severe the past few days.       Pre-Treatment Symptoms:   Pain Assessment: 0-10  0-10 (Numeric) Pain Score: 6    Objective   Findings:   Tenderness R/L lumbar paraspinals, L/R quadratus lumborum tenderness    Midthoracic paraspinal hypertonicity at T5-T8 L/R    Low Back Disability Questionnaire: 111/14/24: 46% impairment; 9/30/24: 40% impaired 7/29/24: 34% impaired; 5/6/24: 28% impaired (IE 36% impaired)    Strength: L/R hip ext/abd/lumbar ext 4/5, other LE grossly 5/5    Antalgic gait pattern upon arrival    Bed mobility: rolling L/R, supine to sit-guarded and painful    Treatments:      Therapeutic Exercise:   Nustep lv 5 x 4 min  Tball heel slides 2x10  LTR x 10  PPT 2 x 5(painful)  Seated Tball lumbar flexion 2x15  Sit to stand 2x5  Open book L/R x 10  SKTC L/R 2x5      Deferred:  FT lat pull down15# 2x 15 L/R  Seated Tball lumbar flexion 2x15  Monster walks grn 15 feet x 4  Tloop side step grn 15 feet x 4  Over the counter hip ext L/R 2x 12  Pulleys x 2 min flex  Wall slides x 15  Banded hip hinge black 2x12  Rev hyper 2x 5  LTR x 10 L/R  PPT x 15  FT trunk rot 5# 2x10  Tloop row red 2x12  Tloop shld ER grn 2x12  Tloop shld IR grn 2x12  Tloop shld ext red 2x12  Prone hip ext x10  "L/R  Prone back ext 2x10  Seated hip IR 2x12 orange  Seated lumbar flexion AROM x 10  Seated lumbar ext iso 10\" x 5  Tloop side step orange 15 feet x 4  Bent over row 12# x 10  Bicep curl 12# x 15  FT single arm high row 10# x12 L/R  Tball heel slides x 15  Tball mod bridge x 3 painful  Hooklying hip abd light with bridge 2x10 , no pain  FT row 10# 2x15  FT chops 15# x 10 L/R  Tloop side step orange 15 feet L/R x 6  FT facepull 2.5# x 10, 5# x 10  Front raise + Tloop shld abd iso pink x 3#  Single arm row L/R 9# 2x10  Sidelying shoulder abd + ER x10  Quadruped weight shift fwd/back x 10  Sit to stand 2x10  FT shld ext 5# 2x10  Side step Tloop orange 15 feet x 2 L/R     Neuromuscular Re-Ed:   Therapeutic Activity:   Gait Training:     Manual Therapy:   Effleurage to L/R erector spinae muscles in cervical/thoracic and lumbar spine.   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 Pt reported some slight improvement with pain after treatment but she is still in moderate pain. Pt's Low Back Disability Questionnaire got worse from 40% impairment to 46% impairment after this recent flare up. Pt would benefit from 4 additional PT visits to address this recent low back pain flare up.     Post-Treatment Symptoms:   Response to Interventions: Decrease in pain   Plan:    Continue PT for 4 additional PT visits. 28 total visits.     Goals:     Expected end: 12/24/24    1. Patient to demonstrate the use of proper body mechanics and posture when performing ADL's and picking up objects > 10 lbs to eliminate/reduce " their symptoms for self care independence.-met    2. Patient to sleep uninterrupted for 6 hours without being awakened by their pain to allow them to perform ADL's and work effectively during the day. -progressing    3. Patient to be able to load and unload the  without increased pain in their low back and demonstrate proper body mechanics.-met    4. Decrease patient's subjective low back pain to 3/10.-met    5. Patient will be able to stand > 30 minutes without increased pain to allow patient to prepare meals for self care independence.-not progressing after recently pain flare up. Pt needs to continue to build her lumbar extension strength to allow her to stand prolonged periods without pain to allow her to do dishes, cook meals at home.     6. Patient to rotate trunk to the left to look over shoulder when driving to safely switch lanes and back up an automobile.-met    7. Patient to be able to load and unload her  without increased pain in their low back while demonstrating proper body mechanics for self care independence.-met    8. Patient will improve their Low Back Disability Score to < 20% to allow patient to carry groceries into home without symptom exacerbation.  -not progressing after recent pain flare up. Pt needs to continue to work on improving her low back strength and ROM to allow her to sit/stand > 30 minutes.

## 2024-11-14 NOTE — LETTER
November 14, 2024    Cheryl Fletcher DO  [address] [facility] [address]    Patient: Arabella Hanks   YOB: 1955   Date of Visit: 11/14/2024       Dear Cheryl Fletcher DO  [address] [place] [address]    The attached plan of care is being sent to you because your patient’s medical reimbursement requires that you certify the plan of care. Your signature is required to allow uninterrupted insurance coverage.      You may indicate your approval by signing below and faxing this form back to us at Dept Fax: [phone].    Please call Dept: [phone] with any questions or concerns.    Thank you for this referral,        Paul Montoya PT  [facility] PHYSICIAN [place]  [facility] PHYSICIAN [place]  [address] [place] [id number] [phone]    Payer: Payor: MEDICAL [facility] MEDICARE / Plan: [facility] MEDICARE / Product Type: *No Product type* /                                                                         Date:     Dear Paul Montoya PT,     Re: Ms. Arabella Hanks, MRN:[id number]    I certify that I have reviewed the attached plan of care and it is medically necessary for Ms. Arabella Hanks (1955) who is under my care.          ______________________________________                    _________________  Provider name and credentials                                           Date and time                                                                                           Plan of Care 11/14/24   Effective from: 11/14/2024  Effective to: 12/24/2024    Plan ID: [id number]                Participants as of Finalize on 11/14/2024      Name Type Comments Contact Info    Cheryl Fletcher DO Primary Care Provider  [phone]    Paul Montoya PT Physical Therapist  [phone]           Last Plan Note       Author: Paul Montoya PT Status: Sign when Signing Visit Last edited: 11/14/2024  4:15 PM         Physical Therapy  Treatment/Progress Note  Patient Name: Arabella Hanks  MRN: [id number]     Time Calculation  Start Time: 1615  Stop Time: 1710  Time Calculation (min): 55 min  PT Modalities Time Entry  E-Stim (Unattended) Time Entry: 12  PT Therapeutic Procedures Time Entry  Manual Therapy Time Entry: 15  Therapeutic Exercise Time Entry: 25  Visit Number: 24/24  Visits/Dates Authorized: [place]    Current Problem:   1. Lumbago with sciatica, right side  Follow Up In Physical Therapy      2. 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 Pain has been pretty severe the past few days.       Pre-Treatment Symptoms:   Pain Assessment: 0-10  0-10 (Numeric) Pain Score: 6    Objective   Findings:   Tenderness R/L lumbar paraspinals, L/R quadratus lumborum tenderness    Midthoracic paraspinal hypertonicity at T5-T8 L/R    Low Back Disability Questionnaire: 111/14/24: 46% impairment; 9/30/24: 40% impaired 7/29/24: 34% impaired; 5/6/24: 28% impaired (IE 36% impaired)    Strength: L/R hip ext/abd/lumbar ext 4/5, other LE grossly 5/5    Antalgic gait pattern upon arrival    Bed mobility: rolling L/R, supine to sit-guarded and painful    Treatments:      Therapeutic Exercise:   Nustep lv 5 x 4 min  Tball heel slides 2x10  LTR x 10  PPT 2 x 5(painful)  Seated Tball lumbar flexion 2x15  Sit to stand 2x5  Open book L/R x 10  SKTC L/R 2x5      Deferred:  FT lat pull down15# 2x 15 L/R  Seated Tball lumbar flexion 2x15  Monster walks grn 15 feet x 4  Tloop side step grn 15 feet x 4  Over the counter hip ext L/R 2x 12  Pulleys x 2 min flex  Wall slides x 15  Banded hip hinge black 2x12  Rev hyper 2x 5  LTR x 10 L/R  PPT x 15  FT trunk rot 5# 2x10  Tloop row red 2x12  Tloop shld ER grn 2x12  Tloop shld IR grn 2x12  Tloop shld ext red 2x12  Prone hip ext x10 L/R  Prone back ext  "2x10  Seated hip IR 2x12 orange  Seated lumbar flexion AROM x 10  Seated lumbar ext iso 10\" x 5  Tloop side step orange 15 feet x 4  Bent over row 12# x 10  Bicep curl 12# x 15  FT single arm high row 10# x12 L/R  Tball heel slides x 15  Tball mod bridge x 3 painful  Hooklying hip abd light with bridge 2x10 , no pain  FT row 10# 2x15  FT chops 15# x 10 L/R  Tloop side step orange 15 feet L/R x 6  FT facepull 2.5# x 10, 5# x 10  Front raise + Tloop shld abd iso pink x 3#  Single arm row L/R 9# 2x10  Sidelying shoulder abd + ER x10  Quadruped weight shift fwd/back x 10  Sit to stand 2x10  FT shld ext 5# 2x10  Side step Tloop orange 15 feet x 2 L/R     Neuromuscular Re-Ed:   Therapeutic Activity:   Gait Training:     Manual Therapy:   Effleurage to L/R erector spinae muscles in cervical/thoracic and lumbar spine.   Gentle PA's  to lumbar spine for pain relief grade II w/ oscillations L/R L1-L5  Modalities:   Unattended e-stim: IFC: applied to lumbar parasapinals, Intensity to tolerance, for 15 minutes, in Prone     Moist heat pack applied to lumbar paraspinals    Assessment:    Pt was making progress with her low back pain until earlier this week when she was on her feet for a few hours serving meals to [employer] for [place] [employer] and is dealing with a low back pain flare up. Pt reported some slight improvement with pain after treatment but she is still in moderate pain. Pt's Low Back Disability Questionnaire got worse from 40% impairment to 46% impairment after this recent flare up. Pt would benefit from 4 additional PT visits to address this recent low back pain flare up.     Post-Treatment Symptoms:   Response to Interventions: Decrease in pain   Plan:    Continue PT for 4 additional PT visits. 28 total visits.     Goals:     Expected end: 12/24/24    1. Patient to demonstrate the use of proper body mechanics and posture when performing ADL's and picking up objects > 10 lbs to eliminate/reduce their symptoms for self " care independence.-met    2. Patient to sleep uninterrupted for 6 hours without being awakened by their pain to allow them to perform ADL's and work effectively during the day. -progressing    3. Patient to be able to load and unload the  without increased pain in their low back and demonstrate proper body mechanics.-met    4. Decrease patient's subjective low back pain to 3/10.-met    5. Patient will be able to stand > 30 minutes without increased pain to allow patient to prepare meals for self care independence.-not progressing after recently pain flare up. Pt needs to continue to build her lumbar extension strength to allow her to stand prolonged periods without pain to allow her to do dishes, cook meals at home.     6. Patient to rotate trunk to the left to look over shoulder when driving to safely switch lanes and back up an automobile.-met    7. Patient to be able to load and unload her  without increased pain in their low back while demonstrating proper body mechanics for self care independence.-met    8. Patient will improve their Low Back Disability Score to < 20% to allow patient to carry groceries into home without symptom exacerbation.  -not progressing after recent pain flare up. Pt needs to continue to work on improving her low back strength and ROM to allow her to sit/stand > 30 minutes.            Current Participants as of 11/14/2024      Name Type Comments Contact Info    Cheryl Fletcher DO Primary Care Provider  [phone]    Signature pending    Paul Montoya PT Physical Therapist  [phone]    Electronically signed by Paul Montoya PT at 11/14/2024 9015 EST

## 2024-11-18 ENCOUNTER — TREATMENT (OUTPATIENT)
Dept: PHYSICAL THERAPY | Facility: CLINIC | Age: 69
End: 2024-11-18
Payer: MEDICARE

## 2024-11-18 DIAGNOSIS — M54.41 LUMBAGO WITH SCIATICA, RIGHT SIDE: ICD-10-CM

## 2024-11-18 DIAGNOSIS — G89.29 OTHER CHRONIC PAIN: ICD-10-CM

## 2024-11-18 PROCEDURE — 97110 THERAPEUTIC EXERCISES: CPT | Mod: GP

## 2024-11-18 ASSESSMENT — PAIN - FUNCTIONAL ASSESSMENT: PAIN_FUNCTIONAL_ASSESSMENT: 0-10

## 2024-11-18 ASSESSMENT — PAIN DESCRIPTION - DESCRIPTORS: DESCRIPTORS: ACHING

## 2024-11-18 ASSESSMENT — PAIN SCALES - GENERAL: PAINLEVEL_OUTOF10: 4

## 2024-11-18 NOTE — PROGRESS NOTES
"Physical Therapy Treatment/Progress Note  Patient Name: Arabella Hanks  MRN: [id number]     Time Calculation  Start Time: 1245  Stop Time: 1340  Time Calculation (min): 55 min  PT Modalities Time Entry  E-Stim (Unattended) Time Entry: 10  PT Therapeutic Procedures Time Entry  Manual Therapy Time Entry: 5  Therapeutic Exercise Time Entry: 30  Visit Number: 25/28  Visits/Dates Authorized: [place]    Current Problem:   1. Lumbago with sciatica, right side  Follow Up In Physical Therapy      2. Other chronic pain  Follow Up In Physical Therapy          Precautions: L/R TKR       Subjective   General:  Pt states her back pain is a little better then last week. Down to 4/10.       Pre-Treatment Symptoms:   Pain Assessment: 0-10  0-10 (Numeric) Pain Score: 4    Objective   Findings:   Tenderness R/L lumbar paraspinals, L/R quadratus lumborum tenderness    Midthoracic paraspinal hypertonicity at T5-T8 L/R    Low Back Disability Questionnaire: 111/14/24: 46% impairment; 9/30/24: 40% impaired 7/29/24: 34% impaired; 5/6/24: 28% impaired (IE 36% impaired)    Strength: L/R hip ext/abd/lumbar ext 4/5, other LE grossly 5/5    Antalgic gait pattern upon arrival    Bed mobility: rolling L/R, supine to sit-guarded and painful    Treatments:      Therapeutic Exercise:   Nustep lv 5 x 4 min  Tball heel slides 2x10  PPT x15  Seated Tball lumbar flexion 2x15  Over the counter hip ext L/R 2x 12  Banded hip hinge black 2x12  Leg curl 55# 2x12  Tloop side step orange 15 feet x 6  Tloop monster walk fwd/ orange 15 feet x 6  Mod side plank L/R x 5  Quadruped hip ext 2x10 L/R  Seated banded hip IR light 2x10    Deferred:  FT trunk rot 5# 2x10  Tloop row red 2x12  Tloop shld ER grn 2x12  Tloop shld IR grn 2x12  Tloop shld ext red 2x12  Prone hip ext x10 L/R  Prone back ext 2x10  Seated hip IR 2x12 orange  Seated lumbar flexion AROM x 10  Seated lumbar ext iso 10\" x 5  Bent over row 12# x 10  Bicep curl 12# x 15  FT single arm high row 10# x12 " L/R  Tball heel slides x 15  Tball mod bridge x 3 painful  Hooklying hip abd light with bridge 2x10 , no pain    Neuromuscular Re-Ed:   Therapeutic Activity:   Gait Training:     Manual Therapy:   Effleurage to L/R erector spinae muscles in cervical/thoracic and lumbar spine.   Gentle PA's  to lumbar spine for pain relief grade II w/ oscillations L/R L1-L5  Modalities:   Dry needling following informed consent: with e-stim; 4Hz, mA to tolerance, applied to L/R lumbar multifudus and L gluteus medius/piriformis, for 10 minutes. Pt lying prone.     Moist heat pack applied to lumbar paraspinals    Assessment:    Pt having much less back discomfort and moving with less pain overall. Pt able to work on hip/core stability exercises without pain increase.     Post-Treatment Symptoms:   Response to Interventions: Decrease in pain   Plan:    Continue PT for 4 additional PT visits. 28 total visits.     Goals:     Expected end: 12/24/24    1. Patient to demonstrate the use of proper body mechanics and posture when performing ADL's and picking up objects > 10 lbs to eliminate/reduce their symptoms for self care independence.-met    2. Patient to sleep uninterrupted for 6 hours without being awakened by their pain to allow them to perform ADL's and work effectively during the day. -progressing    3. Patient to be able to load and unload the  without increased pain in their low back and demonstrate proper body mechanics.-met    4. Decrease patient's subjective low back pain to 3/10.-met    5. Patient will be able to stand > 30 minutes without increased pain to allow patient to prepare meals for self care independence.-not progressing after recently pain flare up. Pt needs to continue to build her lumbar extension strength to allow her to stand prolonged periods without pain to allow her to do dishes, cook meals at home.     6. Patient to rotate trunk to the left to look over shoulder when driving to safely switch lanes  and back up an automobile.-met    7. Patient to be able to load and unload her  without increased pain in their low back while demonstrating proper body mechanics for self care independence.-met    8. Patient will improve their Low Back Disability Score to < 20% to allow patient to carry groceries into home without symptom exacerbation.  -not progressing after recent pain flare up. Pt needs to continue to work on improving her low back strength and ROM to allow her to sit/stand > 30 minutes.

## 2024-11-21 ENCOUNTER — TREATMENT (OUTPATIENT)
Dept: PHYSICAL THERAPY | Facility: CLINIC | Age: 69
End: 2024-11-21
Payer: MEDICARE

## 2024-11-21 DIAGNOSIS — G89.29 OTHER CHRONIC PAIN: ICD-10-CM

## 2024-11-21 DIAGNOSIS — M54.41 LUMBAGO WITH SCIATICA, RIGHT SIDE: ICD-10-CM

## 2024-11-21 PROCEDURE — 97014 ELECTRIC STIMULATION THERAPY: CPT | Mod: GP

## 2024-11-21 PROCEDURE — 97110 THERAPEUTIC EXERCISES: CPT | Mod: GP

## 2024-11-21 PROCEDURE — 97140 MANUAL THERAPY 1/> REGIONS: CPT | Mod: GP

## 2024-11-21 ASSESSMENT — PAIN - FUNCTIONAL ASSESSMENT: PAIN_FUNCTIONAL_ASSESSMENT: 0-10

## 2024-11-21 ASSESSMENT — PAIN SCALES - GENERAL: PAINLEVEL_OUTOF10: 5 - MODERATE PAIN

## 2024-11-21 NOTE — PROGRESS NOTES
"Physical Therapy Treatment      Patient Name: Arabella Hanks  MRN: [id number]     Time Calculation  Start Time: 1345  Stop Time: 1450  Time Calculation (min): 65 min  PT Modalities Time Entry  E-Stim (Unattended) Time Entry: 15  PT Therapeutic Procedures Time Entry  Manual Therapy Time Entry: 10  Therapeutic Exercise Time Entry: 32  Visit Number: 26/28  Visits/Dates Authorized: [place]    Current Problem:   1. Lumbago with sciatica, right side  Follow Up In Physical Therapy      2. Other chronic pain  Follow Up In Physical Therapy          Precautions: L/R TKR       Subjective   General:  Pt states her back pain is moderate today. 5/10.       Pre-Treatment Symptoms:   Pain Assessment: 0-10  0-10 (Numeric) Pain Score: 5 - Moderate pain    Objective   Findings:   Tenderness R/L lumbar paraspinals, L/R quadratus lumborum tenderness    Midthoracic paraspinal hypertonicity at T5-T8 L/R    Low Back Disability Questionnaire: 11/14/24: 46% impairment; 9/30/24: 40% impaired 7/29/24: 34% impaired; 5/6/24: 28% impaired (IE 36% impaired)    Strength: L/R hip ext/abd/lumbar ext 4/5, other LE grossly 5/5    Antalgic gait pattern upon arrival    Bed mobility: rolling L/R, supine to sit-guarded and painful    Treatments:      Therapeutic Exercise:   Nustep lv 5 x 4 min  Tball heel slides 2x10  PPT x15  Seated Tball lumbar flexion 2x15  Over the counter hip ext L/R 2x 12  Banded hip hinge black 2x12  Leg curl 55# 2x12  Tloop side step orange 15 feet x 6  Tloop monster walk fwd/ orange 15 feet x 6  Mod side plank L/R x 10  Quadruped hip ext 2x10 L/R  Seated banded hip IR orange 2x10  Hurdles lateral L/R w/ medicine ball toss 4# x 5  DLS balance w/ wand raise 9# 2x10    Deferred:  FT trunk rot 5# 2x10  Tloop row red 2x12  Tloop shld ER grn 2x12  Tloop shld IR grn 2x12  Tloop shld ext red 2x12  Prone hip ext x10 L/R  Prone back ext 2x10  Seated hip IR 2x12 orange  Seated lumbar flexion AROM x 10  Seated lumbar ext iso 10\" x 5  Bent over " row 12# x 10  Bicep curl 12# x 15  FT single arm high row 10# x12 L/R  Tball heel slides x 15  Tball mod bridge x 3 painful  Hooklying hip abd light with bridge 2x10 , no pain    Manual Therapy:   Effleurage to L/R erector spinae muscles in cervical/thoracic and lumbar spine.   Gentle PA's  to lumbar spine for pain relief grade II w/ oscillations L/R L1-L5    Modalities:   Dry needling following informed consent: with e-stim; 4Hz, mA to tolerance, applied to L/R lumbar multifudus and L gluteus medius/piriformis, for 10 minutes. Pt lying prone.     Moist heat pack applied to lumbar paraspinals    Assessment:    Pt continues to work on building her low back and hip strength to reduce back pain flare ups. Pt continues to report significant pain relief when dry needling with estim is used for pain relief.     Post-Treatment Symptoms:   Response to Interventions: Decrease in pain   Plan:    Continue PT for 4 additional PT visits. 28 total visits.     Goals:     Expected end: 12/24/24    1. Patient to demonstrate the use of proper body mechanics and posture when performing ADL's and picking up objects > 10 lbs to eliminate/reduce their symptoms for self care independence.-met    2. Patient to sleep uninterrupted for 6 hours without being awakened by their pain to allow them to perform ADL's and work effectively during the day. -progressing    3. Patient to be able to load and unload the  without increased pain in their low back and demonstrate proper body mechanics.-met    4. Decrease patient's subjective low back pain to 3/10.-met    5. Patient will be able to stand > 30 minutes without increased pain to allow patient to prepare meals for self care independence.-not progressing after recently pain flare up. Pt needs to continue to build her lumbar extension strength to allow her to stand prolonged periods without pain to allow her to do dishes, cook meals at home.     6. Patient to rotate trunk to the left to  look over shoulder when driving to safely switch lanes and back up an automobile.-met    7. Patient to be able to load and unload her  without increased pain in their low back while demonstrating proper body mechanics for self care independence.-met    8. Patient will improve their Low Back Disability Score to < 20% to allow patient to carry groceries into home without symptom exacerbation.  -not progressing after recent pain flare up. Pt needs to continue to work on improving her low back strength and ROM to allow her to sit/stand > 30 minutes.

## 2024-12-23 ENCOUNTER — APPOINTMENT (OUTPATIENT)
Dept: PHYSICAL THERAPY | Facility: CLINIC | Age: 69
End: 2024-12-23
Payer: MEDICARE

## 2024-12-26 ENCOUNTER — APPOINTMENT (OUTPATIENT)
Dept: PHYSICAL THERAPY | Facility: CLINIC | Age: 69
End: 2024-12-26
Payer: MEDICARE

## 2025-02-25 ENCOUNTER — OFFICE VISIT (OUTPATIENT)
Dept: ORTHOPEDIC SURGERY | Facility: CLINIC | Age: 70
End: 2025-02-25
Payer: MEDICARE

## 2025-02-25 DIAGNOSIS — M65.342 TRIGGER FINGER, LEFT RING FINGER: ICD-10-CM

## 2025-02-25 DIAGNOSIS — M65.341 TRIGGER FINGER, RIGHT RING FINGER: Primary | ICD-10-CM

## 2025-02-25 DIAGNOSIS — M75.41 SHOULDER IMPINGEMENT SYNDROME, RIGHT: ICD-10-CM

## 2025-02-25 PROCEDURE — 99213 OFFICE O/P EST LOW 20 MIN: CPT | Mod: 25 | Performed by: ORTHOPAEDIC SURGERY

## 2025-02-25 PROCEDURE — 20550 NJX 1 TENDON SHEATH/LIGAMENT: CPT | Performed by: ORTHOPAEDIC SURGERY

## 2025-02-25 PROCEDURE — 1125F AMNT PAIN NOTED PAIN PRSNT: CPT | Performed by: ORTHOPAEDIC SURGERY

## 2025-02-25 PROCEDURE — 1160F RVW MEDS BY RX/DR IN RCRD: CPT | Performed by: ORTHOPAEDIC SURGERY

## 2025-02-25 PROCEDURE — 1036F TOBACCO NON-USER: CPT | Performed by: ORTHOPAEDIC SURGERY

## 2025-02-25 PROCEDURE — 2500000004 HC RX 250 GENERAL PHARMACY W/ HCPCS (ALT 636 FOR OP/ED): Performed by: ORTHOPAEDIC SURGERY

## 2025-02-25 PROCEDURE — 76942 ECHO GUIDE FOR BIOPSY: CPT | Performed by: ORTHOPAEDIC SURGERY

## 2025-02-25 PROCEDURE — 20611 DRAIN/INJ JOINT/BURSA W/US: CPT | Mod: RT | Performed by: ORTHOPAEDIC SURGERY

## 2025-02-25 PROCEDURE — 1159F MED LIST DOCD IN RCRD: CPT | Performed by: ORTHOPAEDIC SURGERY

## 2025-02-25 PROCEDURE — 99213 OFFICE O/P EST LOW 20 MIN: CPT | Performed by: ORTHOPAEDIC SURGERY

## 2025-02-25 RX ORDER — CEFAZOLIN SODIUM 2 G/100ML
2 INJECTION, SOLUTION INTRAVENOUS ONCE
OUTPATIENT
Start: 2025-02-25 | End: 2025-02-25

## 2025-02-25 RX ADMIN — METHYLPREDNISOLONE ACETATE 20 MG: 40 INJECTION, SUSPENSION INTRA-ARTICULAR; INTRALESIONAL; INTRAMUSCULAR; SOFT TISSUE at 14:30

## 2025-02-25 RX ADMIN — LIDOCAINE HYDROCHLORIDE 1 ML: 10 INJECTION, SOLUTION INFILTRATION; PERINEURAL at 14:30

## 2025-02-25 RX ADMIN — TRIAMCINOLONE ACETONIDE 40 MG: 40 INJECTION, SUSPENSION INTRA-ARTICULAR; INTRAMUSCULAR at 14:30

## 2025-02-25 RX ADMIN — LIDOCAINE HYDROCHLORIDE 3 ML: 10 INJECTION, SOLUTION INFILTRATION; PERINEURAL at 14:30

## 2025-02-25 ASSESSMENT — ENCOUNTER SYMPTOMS
JOINT SWELLING: 1
ARTHRALGIAS: 1
WHEEZING: 0
SHORTNESS OF BREATH: 0
TROUBLE SWALLOWING: 0
FEVER: 0
EYE DISCHARGE: 0
SINUS PRESSURE: 0
CHILLS: 0

## 2025-02-25 ASSESSMENT — PAIN SCALES - GENERAL: PAINLEVEL_OUTOF10: 9

## 2025-02-25 ASSESSMENT — PAIN - FUNCTIONAL ASSESSMENT: PAIN_FUNCTIONAL_ASSESSMENT: 0-10

## 2025-02-25 NOTE — PROGRESS NOTES
Reason for Appointment  Chief Complaint   Patient presents with    Left Shoulder - Pain    Right Thumb - Pain    Left Thumb - Pain     History of Present Illness  Patient is a 69 y.o. female here today for follow-up evaluation of recurrent right shoulder pain and bilateral ring trigger fingers. She had injections in the right shoulder and the right ring finger 3 and half months ago that did give her good relief up until a few weeks ago.  She is also having more triggering in the left ring finger as well but this is not as severe.  We have discussed a trigger release in the future, she is going on a cruise in June.  No other changes in her past medical history, allergies, or medications.    Past Medical History:   Diagnosis Date    Personal history of other specified conditions     History of multiple pulmonary nodules       Past Surgical History:   Procedure Laterality Date    APPENDECTOMY  03/21/2017    Appendectomy    BACK SURGERY  03/21/2017    Back Surgery    BI STEREOTACTIC GUIDED BREAST LEFT LOCALIZATION AND BIOPSY Left 6/14/2016    BI STEREOTACTIC GUIDED BREAST LOCALIZATION AND BIOPSY LEFT [facility]    HYSTERECTOMY  03/21/2017    Hysterectomy    OTHER SURGICAL HISTORY  03/21/2017    Hand Repair       Medication Documentation Review Audit       Reviewed by Ashley Gamboa MA (Medical Assistant) on 02/25/25 at 1255      Medication Order Taking? Sig Documenting Provider Last Dose Status   amLODIPine (Norvasc) 5 mg tablet [id number] Yes Take by mouth once every 24 hours. Historical Provider, MD Taking Active   cholecalciferol (Vitamin D-3) 25 MCG (1000 UT) capsule [id number] Yes Take 1 capsule (25 mcg) by mouth once daily. Historical Provider, MD Taking Active   omeprazole (PriLOSEC) 40 mg DR capsule [id number] Yes TAKE ONE CAPSULE BY MOUTH DAILY IN THE MORNING 30 MINUTES BEFORE BREAKFAST Rajni Perez PA-C Taking Active   omeprazole (PriLOSEC) 40 mg DR capsule [id number] Yes Take 1 capsule (40 mg)  "by mouth once daily in the morning. Take before meals. Historical Provider, MD Taking Active   pramipexole (Mirapex) 0.125 mg tablet [id number] Yes Take 1 tablet (0.125 mg) by mouth once daily at bedtime. Historical Provider, MD Taking Active                    Allergies   Allergen Reactions    Aluminum Aspirin Unknown    Aspirin Unknown and Other     \"had blood clot at 16 and told not to take\"    Codeine Unknown    Lisinopril Cough and Hives    Nsaids (Non-Steroidal Anti-Inflammatory Drug) Other     Stage 3 kidney disease       Review of Systems   Constitutional:  Negative for chills and fever.   HENT:  Negative for nosebleeds, sinus pressure and trouble swallowing.    Eyes:  Negative for discharge.   Respiratory:  Negative for shortness of breath and wheezing.    Cardiovascular:  Negative for chest pain.   Musculoskeletal:  Positive for arthralgias and joint swelling.   Skin:  Negative for pallor and rash.   All other systems reviewed and are negative.    Exam   On exam the right shoulder shows pain with active forward flexion up to 130 degrees.  She has markedly positive impingement signs on the right but fairly good cuff strength with resisted external rotation.  Deltoid is functional.  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  Good pulses and sensation in the upper extremities.    Assessment   Bilateral ring trigger fingers  Right shoulder impingement     Plan   She would like repeat injections today into the shoulder and hands.  She has had multiple injections especially in the right ring trigger finger and we did discuss surgical release this spring.  We sterilely injected under ultrasound guidance Kenalog and lidocaine into the right shoulder subacromial space and Depo-Medrol and lidocaine into bilateral ring A1 pulley tendon sheaths.  Patient understands the small risk of infection and the " signs to look for as well as flare reaction.  Hopefully these give her good relief and we did discuss a right ring trigger release, she understands the risks of surgery including nerve, artery, and tendon damage, infection, continued pain, and the need for future surgery.  She will call and schedule a right ring trigger release in early May.  Patient ID: Arabella Hanks is a 69 y.o. female.    L Inj/Asp: R subacromial bursa on 2/25/2025 2:30 PM  Indications: pain  Details: 22 G needle, ultrasound-guided  Medications: 40 mg triamcinolone acetonide 40 mg/mL; 3 mL lidocaine 10 mg/mL (1 %)  Outcome: tolerated well, no immediate complications    After discussing the risks and benefits of the procedure with proceeded with an injection.  Using ultrasound guidance we identified the acromion, humeral head and the subacromial bursa, images obtained and saved. We then sterilely injected the right subacrominal space with a mixture of 40 mg of Kenalog and 1 cc of 1 % lidocaine. Pt tolerated the procedure well without any adverse reactions.   Procedure, treatment alternatives, risks and benefits explained, specific risks discussed. Consent was given by the patient. Immediately prior to procedure a time out was called to verify the correct patient, procedure, equipment, support staff and site/side marked as required. Patient was prepped and draped in the usual sterile fashion.       Hand / UE Inj/Asp: R ring A1 for trigger finger on 2/25/2025 2:30 PM  Indications: pain  Details: 25 G needle, ultrasound-guided  Medications: 1 mL lidocaine 10 mg/mL (1 %); 20 mg methylPREDNISolone acetate 40 mg/mL  Outcome: tolerated well, no immediate complications    After discussing the risks and benefits of the procedure we proceeded with the injection. Under ultrasound guidance we identified the metacarpal bone and overlying tendon sheath with the FDS and FDP tendons, images obtained and saved. We then sterilely injected the right ring finger  A1 pulley with a mixture of 20 mg of DepoMedrol and .5 cc of 1% lidocaine. Pt tolerated the procedure well without any adverse reactions.    Procedure, treatment alternatives, risks and benefits explained, specific risks discussed. Consent was given by the patient. Immediately prior to procedure a time out was called to verify the correct patient, procedure, equipment, support staff and site/side marked as required. Patient was prepped and draped in the usual sterile fashion.       Hand / UE Inj/Asp: L ring A1 for trigger finger on 2/25/2025 2:30 PM  Indications: pain  Details: 25 G needle, ultrasound-guided  Medications: 1 mL lidocaine 10 mg/mL (1 %); 20 mg methylPREDNISolone acetate 40 mg/mL  Outcome: tolerated well, no immediate complications    After discussing the risks and benefits of the procedure we proceeded with the injection. Under ultrasound guidance we identified the metacarpal bone and overlying tendon sheath with the FDS and FDP tendons, images obtained and saved. We then sterilely injected the left ring finger A1 pulley with a mixture of 20 mg of DepoMedrol and .5 cc of 1% lidocaine. Pt tolerated the procedure well without any adverse reactions.    Procedure, treatment alternatives, risks and benefits explained, specific risks discussed. Consent was given by the patient. Immediately prior to procedure a time out was called to verify the correct patient, procedure, equipment, support staff and site/side marked as required. Patient was prepped and draped in the usual sterile fashion.         Holly SAMUEL PA-C, am acting as a scribe and attest that this documentation has been prepared under the direction and in the presence of Fantasma Farfan MD.    By signing below, IFantasma MD, personally performed the services described in this documentation. All medical record entries made by the scribe were at my direction and in my presence. I have reviewed the chart and agree that the record reflects  my personal performance and is accurate and complete.

## 2025-02-27 RX ORDER — METHYLPREDNISOLONE ACETATE 40 MG/ML
20 INJECTION, SUSPENSION INTRA-ARTICULAR; INTRALESIONAL; INTRAMUSCULAR; SOFT TISSUE
Status: COMPLETED | OUTPATIENT
Start: 2025-02-25 | End: 2025-02-25

## 2025-02-27 RX ORDER — LIDOCAINE HYDROCHLORIDE 10 MG/ML
3 INJECTION, SOLUTION INFILTRATION; PERINEURAL
Status: COMPLETED | OUTPATIENT
Start: 2025-02-25 | End: 2025-02-25

## 2025-02-27 RX ORDER — TRIAMCINOLONE ACETONIDE 40 MG/ML
40 INJECTION, SUSPENSION INTRA-ARTICULAR; INTRAMUSCULAR
Status: COMPLETED | OUTPATIENT
Start: 2025-02-25 | End: 2025-02-25

## 2025-02-27 RX ORDER — LIDOCAINE HYDROCHLORIDE 10 MG/ML
1 INJECTION, SOLUTION INFILTRATION; PERINEURAL
Status: COMPLETED | OUTPATIENT
Start: 2025-02-25 | End: 2025-02-25

## 2025-02-28 PROBLEM — M65.341 TRIGGER FINGER, RIGHT RING FINGER: Status: ACTIVE | Noted: 2025-02-25

## 2025-05-02 ENCOUNTER — HOSPITAL ENCOUNTER (OUTPATIENT)
Facility: HOSPITAL | Age: 70
Setting detail: OUTPATIENT SURGERY
Discharge: HOME | End: 2025-05-02
Attending: ORTHOPAEDIC SURGERY | Admitting: ORTHOPAEDIC SURGERY
Payer: MEDICARE

## 2025-05-02 VITALS
OXYGEN SATURATION: 98 % | HEART RATE: 80 BPM | DIASTOLIC BLOOD PRESSURE: 98 MMHG | TEMPERATURE: 97.9 F | RESPIRATION RATE: 20 BRPM | WEIGHT: 182.54 LBS | SYSTOLIC BLOOD PRESSURE: 146 MMHG | BODY MASS INDEX: 28.65 KG/M2 | HEIGHT: 67 IN

## 2025-05-02 DIAGNOSIS — M65.341 TRIGGER FINGER, RIGHT RING FINGER: Primary | ICD-10-CM

## 2025-05-02 PROCEDURE — 3600000003 HC OR TIME - INITIAL BASE CHARGE - PROCEDURE LEVEL THREE: Performed by: ORTHOPAEDIC SURGERY

## 2025-05-02 PROCEDURE — 3600000008 HC OR TIME - EACH INCREMENTAL 1 MINUTE - PROCEDURE LEVEL THREE: Performed by: ORTHOPAEDIC SURGERY

## 2025-05-02 PROCEDURE — 7100000009 HC PHASE TWO TIME - INITIAL BASE CHARGE: Performed by: ORTHOPAEDIC SURGERY

## 2025-05-02 PROCEDURE — 26055 INCISE FINGER TENDON SHEATH: CPT | Performed by: ORTHOPAEDIC SURGERY

## 2025-05-02 PROCEDURE — 2500000004 HC RX 250 GENERAL PHARMACY W/ HCPCS (ALT 636 FOR OP/ED): Performed by: ORTHOPAEDIC SURGERY

## 2025-05-02 PROCEDURE — 7100000010 HC PHASE TWO TIME - EACH INCREMENTAL 1 MINUTE: Performed by: ORTHOPAEDIC SURGERY

## 2025-05-02 RX ORDER — BUTYROSPERMUM PARKII(SHEA BUTTER), SIMMONDSIA CHINENSIS (JOJOBA) SEED OIL, ALOE BARBADENSIS LEAF EXTRACT .01; 1; 3.5 G/100G; G/100G; G/100G
250 LIQUID TOPICAL 2 TIMES DAILY
COMMUNITY

## 2025-05-02 RX ORDER — DOXYCYCLINE 100 MG/1
100 CAPSULE ORAL 2 TIMES DAILY
Qty: 14 CAPSULE | Refills: 0 | Status: SHIPPED | OUTPATIENT
Start: 2025-05-02 | End: 2025-05-09

## 2025-05-02 ASSESSMENT — ENCOUNTER SYMPTOMS
TROUBLE SWALLOWING: 0
ARTHRALGIAS: 1
JOINT SWELLING: 1
FEVER: 0
SHORTNESS OF BREATH: 0
WOUND: 0
WHEEZING: 0
SINUS PAIN: 0
EYE DISCHARGE: 0
CHILLS: 0

## 2025-05-02 ASSESSMENT — PAIN - FUNCTIONAL ASSESSMENT
PAIN_FUNCTIONAL_ASSESSMENT: 0-10
PAIN_FUNCTIONAL_ASSESSMENT: 0-10

## 2025-05-02 ASSESSMENT — PAIN DESCRIPTION - DESCRIPTORS: DESCRIPTORS: OTHER (COMMENT);ACHING

## 2025-05-02 ASSESSMENT — COLUMBIA-SUICIDE SEVERITY RATING SCALE - C-SSRS
1. IN THE PAST MONTH, HAVE YOU WISHED YOU WERE DEAD OR WISHED YOU COULD GO TO SLEEP AND NOT WAKE UP?: NO
1. IN THE PAST MONTH, HAVE YOU WISHED YOU WERE DEAD OR WISHED YOU COULD GO TO SLEEP AND NOT WAKE UP?: NO
2. HAVE YOU ACTUALLY HAD ANY THOUGHTS OF KILLING YOURSELF?: NO
6. HAVE YOU EVER DONE ANYTHING, STARTED TO DO ANYTHING, OR PREPARED TO DO ANYTHING TO END YOUR LIFE?: NO
2. HAVE YOU ACTUALLY HAD ANY THOUGHTS OF KILLING YOURSELF?: NO
6. HAVE YOU EVER DONE ANYTHING, STARTED TO DO ANYTHING, OR PREPARED TO DO ANYTHING TO END YOUR LIFE?: NO

## 2025-05-02 ASSESSMENT — PAIN SCALES - GENERAL
PAINLEVEL_OUTOF10: 0 - NO PAIN
PAINLEVEL_OUTOF10: 1

## 2025-05-02 NOTE — H&P
History Of Present Illness  Arabella Hanks is a 69 y.o. female presenting without right ring trigger finger.  She has had injections in the past have only given her short-term relief.  She has pain with gripping and the finger will lock down on her and catch on her.  Denies any shortness of breath, fever, chills today.     Past Medical History  Medical History[1]    Surgical History  Surgical History[2]     Social History  She reports that she has never smoked. She has never used smokeless tobacco. She reports that she does not currently use alcohol. She reports that she does not use drugs.    Family History  Family History[3]     Allergies  Aluminum aspirin, Aspirin, Lisinopril, Nsaids (non-steroidal anti-inflammatory drug), Bactrim [sulfamethoxazole-trimethoprim], and Codeine    Review of Systems   Constitutional:  Negative for chills and fever.   HENT:  Negative for mouth sores, sinus pain and trouble swallowing.    Eyes:  Negative for discharge.   Respiratory:  Negative for shortness of breath and wheezing.    Cardiovascular:  Negative for chest pain.   Musculoskeletal:  Positive for arthralgias and joint swelling.   Skin:  Negative for rash and wound.   All other systems reviewed and are negative.    Physical Exam  On exam patient is alert, awake, in no acute distress.  Head is normocephalic, no JVD, no auditory wheezes.  She has decent shoulder and elbow motion.  DJD in the hands, active triggering of the right ring finger and tender over the right ring A1 pulley tendon sheath.  Heart rate is regular in rhythm, normal S1 and S2.  Lungs are clear to auscultation bilaterally.  Good pulses and sensation in the upper extremity.    Last Recorded Vitals  There were no vitals taken for this visit.    Relevant Results         Assessment & Plan  Trigger finger, right ring finger    She is cleared for a right ring trigger release under local anesthesia    I spent 15 minutes in the professional and overall care of  this patient.      Holly Jung PA-C         [1]   Past Medical History:  Diagnosis Date    Personal history of other specified conditions     History of multiple pulmonary nodules   [2]   Past Surgical History:  Procedure Laterality Date    APPENDECTOMY  03/21/2017    Appendectomy    BACK SURGERY  03/21/2017    Back Surgery    BI STEREOTACTIC GUIDED BREAST LEFT LOCALIZATION AND BIOPSY Left 6/14/2016    BI STEREOTACTIC GUIDED BREAST LOCALIZATION AND BIOPSY LEFT [facility]    HYSTERECTOMY  03/21/2017    Hysterectomy    OTHER SURGICAL HISTORY  03/21/2017    Hand Repair   [3] No family history on file.

## 2025-05-02 NOTE — DISCHARGE INSTRUCTIONS
You had trigger finger surgery today, due to local anesthesia the hand may be numb for a few hours after surgery, this is very normal and should slowly wear off.    Some swelling and bruising in the fingers and palm is very normal after surgery.    Keep dressing on hand for 2-3 days, after that you may take dressing off and put a band-aid over wound.    DO NOT get wound wet for 5 days post-op, after that you can shower/wash hand and get wound wet, pat dry.    If able, take 800mg Ibuprofen and/or Tylenol for pain after surgery if needed.    Use the hand for activities as tolerated, goal is to regain full digital and wrist motion as quickly as possible but avoid heavy lifting until advised.    Follow up in the office by calling [phone] in 10-13 days for a post-op appt

## 2025-05-02 NOTE — OP NOTE
RELEASE, SOFT TISSUE, UPPER EXTREMITY (R) Operative Note     Date: 2025  OR Location: [facility]    Name: Arabella Hanks, : 1955, Age: 69 y.o., MRN: [id number], Sex: female    Diagnosis  Pre-op Diagnosis      * Trigger finger, right ring finger [M65.341] Post-op Diagnosis     * Trigger finger, right ring finger [M65.341]     Procedures  RELEASE, SOFT TISSUE, UPPER EXTREMITY  39066 - MO TENDON SHEATH INCISION  Right ring finger open A1 pulley trigger release    Surgeons      * Fantasma Farfan - Primary    Resident/Fellow/Other Assistant:  Surgeons and Role:  * No surgeons found with a matching role *    Staff:   Circulator: Tricia  Scrub Person: Holly Pool Person: Estela    Anesthesia Staff: No anesthesia staff entered.    Procedure Summary  Anesthesia: Anesthesia type not filed in the log.  ASA: ASA status not filed in the log.  Estimated Blood Loss: 1mL  Intra-op Medications:   Administrations occurring from 0740 to 0820 on 25:   Medication Name Total Dose   lidocaine (Xylocaine) 5 mL, bupivacaine PF (Marcaine) 0.5 % (5 mg/mL) 5 mL syringe 9 mL         Intraprocedure I/O Totals       None           Specimen: No specimens collected              Drains and/or Catheters: * None in log *    Tourniquet Times:     Total Tourniquet Time Documented:  Arm - Upper (Right) - 5 minutes  Total: Arm - Upper (Right) - 5 minutes      Implants:     Findings: Thickened A1 pulley    Indications: Arabella Hanks is an 69 y.o. female who is having surgery for Trigger finger, right ring finger [M65.341].  Pleasant patient comes in for A1 pulley trigger release understanding clear there are severe risk such as nerve artery tendon damage and faction continued pain stiffness she does have some Dupuytren's in the palm that can react and become more fibrotic informed consent obtained    The patient was seen in the preoperative area. The risks, benefits, complications, treatment options, non-operative alternatives, expected  recovery and outcomes were discussed with the patient. The possibilities of reaction to medication, pulmonary aspiration, injury to surrounding structures, bleeding, recurrent infection, the need for additional procedures, failure to diagnose a condition, and creating a complication requiring transfusion or operation were discussed with the patient. The patient concurred with the proposed plan, giving informed consent.  The site of surgery was properly noted/marked if necessary per policy. The patient has been actively warmed in preoperative area. Preoperative antibiotics are not indicated. Venous thrombosis prophylaxis are not indicated.    Procedure Details: Pleasant patient brought the operating room and after sterilely prepping draping performing a timeout placed about local made a 2 cm bleak incision over the A1 pulley after raising the tourniquet went down to the skin through the fascia onto the A1 pulley.  We released the A1 pulley and pretendinous fascia fully no triggering at this point underlying tendons look good copious irrigated closed wound light compressive dressing was placed fingers pinked up nicely no complications  Evidence of Infection: No   Complications:  None; patient tolerated the procedure well.    Disposition: PACU - hemodynamically stable.  Condition: stable                 Additional Details: 0    Attending Attestation: I performed the procedure.    Fantasma Farfan  Phone Number: [phone]

## 2025-05-05 ASSESSMENT — PAIN SCALES - GENERAL: PAINLEVEL_OUTOF10: 0 - NO PAIN

## 2025-05-13 ENCOUNTER — OFFICE VISIT (OUTPATIENT)
Dept: ORTHOPEDIC SURGERY | Facility: CLINIC | Age: 70
End: 2025-05-13
Payer: MEDICARE

## 2025-05-13 DIAGNOSIS — M65.341 TRIGGER FINGER, RIGHT RING FINGER: Primary | ICD-10-CM

## 2025-05-13 PROCEDURE — 1125F AMNT PAIN NOTED PAIN PRSNT: CPT | Performed by: PHYSICIAN ASSISTANT

## 2025-05-13 PROCEDURE — 1160F RVW MEDS BY RX/DR IN RCRD: CPT | Performed by: PHYSICIAN ASSISTANT

## 2025-05-13 PROCEDURE — 99211 OFF/OP EST MAY X REQ PHY/QHP: CPT | Performed by: PHYSICIAN ASSISTANT

## 2025-05-13 PROCEDURE — 1159F MED LIST DOCD IN RCRD: CPT | Performed by: PHYSICIAN ASSISTANT

## 2025-05-13 PROCEDURE — 1036F TOBACCO NON-USER: CPT | Performed by: PHYSICIAN ASSISTANT

## 2025-05-13 ASSESSMENT — COLUMBIA-SUICIDE SEVERITY RATING SCALE - C-SSRS
2. HAVE YOU ACTUALLY HAD ANY THOUGHTS OF KILLING YOURSELF?: NO
1. IN THE PAST MONTH, HAVE YOU WISHED YOU WERE DEAD OR WISHED YOU COULD GO TO SLEEP AND NOT WAKE UP?: NO
6. HAVE YOU EVER DONE ANYTHING, STARTED TO DO ANYTHING, OR PREPARED TO DO ANYTHING TO END YOUR LIFE?: NO

## 2025-05-13 ASSESSMENT — PAIN SCALES - GENERAL: PAINLEVEL_OUTOF10: 3

## 2025-05-13 ASSESSMENT — PAIN - FUNCTIONAL ASSESSMENT: PAIN_FUNCTIONAL_ASSESSMENT: 0-10

## 2025-05-13 NOTE — PROGRESS NOTES
Reason for Appointment  Chief Complaint   Patient presents with    Right Hand - Post-op     History of Present Illness  Patient is here today 2 weeks status post a right ring trigger release on 5/2/2025.  She is doing well, wound is healing nicely with no signs of infection, sutures removed today.  She is working on regaining full motion and has normal postoperative swelling.  She was educated in setting scar massage in 4 days and we would be happy to see her back any point for any further issues.    Assessment   Right ring trigger finger

## 2025-05-15 LAB
ALBUMIN SERPL-MCNC: 3.8 G/DL (ref 3.6–5.1)
BUN SERPL-MCNC: 21 MG/DL (ref 7–25)
BUN/CREAT SERPL: 19 (CALC) (ref 6–22)
CALCIUM SERPL-MCNC: 8.8 MG/DL (ref 8.6–10.4)
CHLORIDE SERPL-SCNC: 106 MMOL/L (ref 98–110)
CO2 SERPL-SCNC: 24 MMOL/L (ref 20–32)
CREAT SERPL-MCNC: 1.08 MG/DL (ref 0.6–1)
EGFRCR SERPLBLD CKD-EPI 2021: 55 ML/MIN/1.73M2
ERYTHROCYTE [DISTWIDTH] IN BLOOD BY AUTOMATED COUNT: 13.4 % (ref 11–15)
GLUCOSE SERPL-MCNC: 118 MG/DL (ref 65–99)
HCT VFR BLD AUTO: 39.3 % (ref 35–45)
HGB BLD-MCNC: 12.6 G/DL (ref 11.7–15.5)
MCH RBC QN AUTO: 29.2 PG (ref 27–33)
MCHC RBC AUTO-ENTMCNC: 32.1 G/DL (ref 32–36)
MCV RBC AUTO: 91.2 FL (ref 80–100)
PHOSPHATE SERPL-MCNC: 3.6 MG/DL (ref 2.1–4.3)
PLATELET # BLD AUTO: 279 THOUSAND/UL (ref 140–400)
PMV BLD REES-ECKER: 10 FL (ref 7.5–12.5)
POTASSIUM SERPL-SCNC: 4.4 MMOL/L (ref 3.5–5.3)
RBC # BLD AUTO: 4.31 MILLION/UL (ref 3.8–5.1)
SODIUM SERPL-SCNC: 142 MMOL/L (ref 135–146)
WBC # BLD AUTO: 5.4 THOUSAND/UL (ref 3.8–10.8)

## 2025-06-10 ENCOUNTER — APPOINTMENT (OUTPATIENT)
Dept: ORTHOPEDIC SURGERY | Facility: CLINIC | Age: 70
End: 2025-06-10
Payer: MEDICARE

## 2025-06-24 ENCOUNTER — OFFICE VISIT (OUTPATIENT)
Dept: ORTHOPEDIC SURGERY | Facility: CLINIC | Age: 70
End: 2025-06-24
Payer: MEDICARE

## 2025-06-24 DIAGNOSIS — M65.341 TRIGGER FINGER, RIGHT RING FINGER: Primary | ICD-10-CM

## 2025-06-24 PROCEDURE — 99024 POSTOP FOLLOW-UP VISIT: CPT | Performed by: PHYSICIAN ASSISTANT

## 2025-06-24 PROCEDURE — 1036F TOBACCO NON-USER: CPT | Performed by: PHYSICIAN ASSISTANT

## 2025-06-24 PROCEDURE — 1125F AMNT PAIN NOTED PAIN PRSNT: CPT | Performed by: PHYSICIAN ASSISTANT

## 2025-06-24 PROCEDURE — 1160F RVW MEDS BY RX/DR IN RCRD: CPT | Performed by: PHYSICIAN ASSISTANT

## 2025-06-24 PROCEDURE — 99213 OFFICE O/P EST LOW 20 MIN: CPT | Performed by: PHYSICIAN ASSISTANT

## 2025-06-24 PROCEDURE — 1159F MED LIST DOCD IN RCRD: CPT | Performed by: PHYSICIAN ASSISTANT

## 2025-06-24 ASSESSMENT — PATIENT HEALTH QUESTIONNAIRE - PHQ9
2. FEELING DOWN, DEPRESSED OR HOPELESS: NOT AT ALL
SUM OF ALL RESPONSES TO PHQ9 QUESTIONS 1 AND 2: 0
1. LITTLE INTEREST OR PLEASURE IN DOING THINGS: NOT AT ALL

## 2025-06-24 ASSESSMENT — PAIN SCALES - GENERAL: PAINLEVEL_OUTOF10: 7

## 2025-06-24 ASSESSMENT — PAIN - FUNCTIONAL ASSESSMENT: PAIN_FUNCTIONAL_ASSESSMENT: 0-10

## 2025-06-25 ENCOUNTER — OFFICE VISIT (OUTPATIENT)
Dept: OTOLARYNGOLOGY | Facility: CLINIC | Age: 70
End: 2025-06-25
Payer: MEDICARE

## 2025-06-25 DIAGNOSIS — H61.21 IMPACTED CERUMEN OF RIGHT EAR: ICD-10-CM

## 2025-06-25 DIAGNOSIS — Z86.69 HISTORY OF EAR DISORDER: Primary | ICD-10-CM

## 2025-06-25 PROCEDURE — 1036F TOBACCO NON-USER: CPT | Performed by: OTOLARYNGOLOGY

## 2025-06-25 PROCEDURE — 1159F MED LIST DOCD IN RCRD: CPT | Performed by: OTOLARYNGOLOGY

## 2025-06-25 PROCEDURE — 99202 OFFICE O/P NEW SF 15 MIN: CPT | Performed by: OTOLARYNGOLOGY

## 2025-06-25 PROCEDURE — 69210 REMOVE IMPACTED EAR WAX UNI: CPT | Performed by: OTOLARYNGOLOGY

## 2025-06-25 ASSESSMENT — ENCOUNTER SYMPTOMS
DEPRESSION: 0
OCCASIONAL FEELINGS OF UNSTEADINESS: 0
LOSS OF SENSATION IN FEET: 0

## 2025-06-25 NOTE — PROGRESS NOTES
"History Of Present Illness  Arabella Hanks is a 69 y.o. female presenting with: \"Left ear blocked\".  She is kindly referred by Dr. Fletcher.    On examination, there was wax buildup in left ear canal.  Cleaning was done.  Tympanic membranes look intact bilaterally.  Blocked feeling in the left ear canal went away after cleaning.    Plan  1-follow-up as needed     Past Medical History  She has a past medical history of Personal history of other specified conditions.    Surgical History  She has a past surgical history that includes Appendectomy (03/21/2017); Back surgery (03/21/2017); Other surgical history (03/21/2017); Hysterectomy (03/21/2017); and BI stereotactic guided breast left localization and biopsy (Left, 6/14/2016).     Social History  She reports that she has never smoked. She has never used smokeless tobacco. She reports that she does not currently use alcohol. She reports that she does not use drugs.    Family History  Family History[1]     Allergies  Aluminum aspirin, Aspirin, Lisinopril, Nsaids (non-steroidal anti-inflammatory drug), Bactrim [sulfamethoxazole-trimethoprim], and Codeine    Review of Systems   Difficulty hearing  Loss of hearing  Ear pain  Tinnitus  Sinus pressure     Physical Exam    General appearance: Healthy-appearing, well-nourished, well groomed, in no acute distress.     Head and Face: Atraumatic with no masses, lesions, or scarring.      Salivary glands: No tenderness of the parotid glands or parotid masses.     No tenderness of the submandibular glands or submandibular masses.      Facial strength: Normal strength and symmetry, no synkinesis or facial tic.     Eyes: Conjunctivas look non-hyperemic bilaterally    Ears: Patient had left  ear wax. Using small instrument(s) and/or suction cleaning was done. Patient tolerated the procedure well. Bilaterally ear canals look normal. Tympanic membranes look intact, no hyperemia, fluid or retraction. Hearing grossly normal.  " "    Nose: Mucosa looks normal. No purulent discharge.     Oral Cavity/Mouth: Lips and tongue look normal.     Throat: No postnasal discharge. No tonsil hypertrophy. No hyperemia.    Neck: Symmetrical, trachea midline.     Pulmonary: Normal respiratory effort.     Lymphatic: No palpable pathologic lymph nodes at neck.     Neurological/Psychiatric Orientation to person, place, and time: Normal.     Mood and affect: Normal.      Extremities: No clubbing.     Skin: No significant skin lesions were noted at face or neck        Procedure  EAR WAX REMOVAL 06.25.2025  Patient had left  ear wax. Using small instrument(s) and/or suction cleaning was done. Patient tolerated the procedure well.          Last Recorded Vitals  There were no vitals taken for this visit.    Relevant Results    Assessment and Plan:  Arabella Hanks is a 69 y.o. female presenting with: \"Left ear blocked\".  She is kindly referred by Dr. Fletcher.    On examination, there was wax buildup in left ear canal.  Cleaning was done.  Tympanic membranes look intact bilaterally.  Blocked feeling in the left ear canal went away after cleaning.    Plan  1-follow-up as needed       Lauren Vaughn  Otolaryngology - Head & Neck Surgery         [1] No family history on file.    "

## 2025-06-26 NOTE — PROGRESS NOTES
Reason for Appointment  Chief Complaint   Patient presents with    Right Ring Finger - Follow-up, Post-op     History of Present Illness  Patient is here today almost 2 months status post a right ring finger trigger release.  The wound is healed very nicely with no signs of infection, minimal scar today.  She still does have some mild pain with making a heavy tight fist with some just mild swelling over the A1 pulley tendon sheath area.  The finger is not catching or locking on her.  We discussed continuing to stretch the finger and hopefully symptoms will continue to improve over the next month, we did discuss a possible injection if she still has some pain with making a tight fist but she does have some arthritic change in the fingers as well.  We did have her see her back for continued pain for a possible injection.    Assessment   Right ring trigger

## 2025-08-14 ENCOUNTER — APPOINTMENT (OUTPATIENT)
Dept: PULMONOLOGY | Facility: CLINIC | Age: 70
End: 2025-08-14
Payer: MEDICARE

## 2025-09-04 ENCOUNTER — OFFICE VISIT (OUTPATIENT)
Dept: VASCULAR SURGERY | Facility: CLINIC | Age: 70
End: 2025-09-04
Payer: MEDICARE

## 2025-09-04 VITALS
BODY MASS INDEX: 29.04 KG/M2 | SYSTOLIC BLOOD PRESSURE: 129 MMHG | DIASTOLIC BLOOD PRESSURE: 82 MMHG | WEIGHT: 180.7 LBS | HEART RATE: 78 BPM | HEIGHT: 66 IN | OXYGEN SATURATION: 94 %

## 2025-09-04 DIAGNOSIS — I73.9 PAD (PERIPHERAL ARTERY DISEASE): Primary | ICD-10-CM

## 2025-09-04 DIAGNOSIS — I83.11 VARICOSE VEINS OF BOTH LOWER EXTREMITIES WITH INFLAMMATION: ICD-10-CM

## 2025-09-04 DIAGNOSIS — I87.8 VENOUS STASIS: ICD-10-CM

## 2025-09-04 DIAGNOSIS — I83.12 VARICOSE VEINS OF BOTH LOWER EXTREMITIES WITH INFLAMMATION: ICD-10-CM

## 2025-09-04 PROCEDURE — 1159F MED LIST DOCD IN RCRD: CPT | Performed by: NURSE PRACTITIONER

## 2025-09-04 PROCEDURE — 1160F RVW MEDS BY RX/DR IN RCRD: CPT | Performed by: NURSE PRACTITIONER

## 2025-09-04 PROCEDURE — 99213 OFFICE O/P EST LOW 20 MIN: CPT | Performed by: NURSE PRACTITIONER

## 2025-09-04 PROCEDURE — 1036F TOBACCO NON-USER: CPT | Performed by: NURSE PRACTITIONER

## 2025-09-04 PROCEDURE — 99203 OFFICE O/P NEW LOW 30 MIN: CPT | Performed by: NURSE PRACTITIONER

## 2025-09-04 PROCEDURE — 3008F BODY MASS INDEX DOCD: CPT | Performed by: NURSE PRACTITIONER

## 2025-09-04 PROCEDURE — 1126F AMNT PAIN NOTED NONE PRSNT: CPT | Performed by: NURSE PRACTITIONER

## 2025-09-04 ASSESSMENT — COLUMBIA-SUICIDE SEVERITY RATING SCALE - C-SSRS
1. IN THE PAST MONTH, HAVE YOU WISHED YOU WERE DEAD OR WISHED YOU COULD GO TO SLEEP AND NOT WAKE UP?: NO
6. HAVE YOU EVER DONE ANYTHING, STARTED TO DO ANYTHING, OR PREPARED TO DO ANYTHING TO END YOUR LIFE?: NO
2. HAVE YOU ACTUALLY HAD ANY THOUGHTS OF KILLING YOURSELF?: NO
2. HAVE YOU ACTUALLY HAD ANY THOUGHTS OF KILLING YOURSELF?: NO
6. HAVE YOU EVER DONE ANYTHING, STARTED TO DO ANYTHING, OR PREPARED TO DO ANYTHING TO END YOUR LIFE?: NO
1. IN THE PAST MONTH, HAVE YOU WISHED YOU WERE DEAD OR WISHED YOU COULD GO TO SLEEP AND NOT WAKE UP?: NO

## 2025-09-04 ASSESSMENT — PAIN SCALES - GENERAL: PAINLEVEL_OUTOF10: 0-NO PAIN

## 2025-09-11 ENCOUNTER — APPOINTMENT (OUTPATIENT)
Dept: VASCULAR SURGERY | Facility: CLINIC | Age: 70
End: 2025-09-11
Payer: MEDICARE

## 2025-10-09 ENCOUNTER — APPOINTMENT (OUTPATIENT)
Dept: VASCULAR SURGERY | Facility: CLINIC | Age: 70
End: 2025-10-09
Payer: MEDICARE

## (undated) DEVICE — TRAY, DRY PREP, PREMIUM

## (undated) DEVICE — SPONGE GAUZE, XRAY SC+RFID, 4X4 16 PLY, STERILE

## (undated) DEVICE — Device

## (undated) DEVICE — SUTURE, PROLENE 4-0, 18IN, PS2, BLUE MONO

## (undated) DEVICE — DRESSING, NON-ADHERENT, OIL EMULSION, CURITY, 3 X 16 IN, STERILE

## (undated) DEVICE — GLOVE, SURGICAL, PROTEXIS PI , 7.5, PF, LF

## (undated) DEVICE — SOLUTION, CHLORHEXIDINE, 4%, 4OZ